# Patient Record
Sex: FEMALE | Race: WHITE | NOT HISPANIC OR LATINO | Employment: OTHER | URBAN - METROPOLITAN AREA
[De-identification: names, ages, dates, MRNs, and addresses within clinical notes are randomized per-mention and may not be internally consistent; named-entity substitution may affect disease eponyms.]

---

## 2017-01-18 ENCOUNTER — COMMUNICATION - HEALTHEAST (OUTPATIENT)
Dept: INTERNAL MEDICINE | Facility: CLINIC | Age: 77
End: 2017-01-18

## 2017-01-18 DIAGNOSIS — E11.9 TYPE 2 DIABETES MELLITUS (H): ICD-10-CM

## 2017-01-25 ENCOUNTER — OFFICE VISIT - HEALTHEAST (OUTPATIENT)
Dept: INTERNAL MEDICINE | Facility: CLINIC | Age: 77
End: 2017-01-25

## 2017-01-25 DIAGNOSIS — E78.2 MIXED HYPERLIPIDEMIA: ICD-10-CM

## 2017-01-25 DIAGNOSIS — E11.9 TYPE 2 DIABETES MELLITUS (H): ICD-10-CM

## 2017-01-25 DIAGNOSIS — E55.9 VITAMIN D DEFICIENCY: ICD-10-CM

## 2017-01-25 DIAGNOSIS — I10 ESSENTIAL HYPERTENSION WITH GOAL BLOOD PRESSURE LESS THAN 140/90: ICD-10-CM

## 2017-01-25 DIAGNOSIS — Z00.01 ENCOUNTER FOR GENERAL ADULT MEDICAL EXAMINATION WITH ABNORMAL FINDINGS: ICD-10-CM

## 2017-01-25 ASSESSMENT — MIFFLIN-ST. JEOR: SCORE: 1216.48

## 2017-02-13 ENCOUNTER — COMMUNICATION - HEALTHEAST (OUTPATIENT)
Dept: INTERNAL MEDICINE | Facility: CLINIC | Age: 77
End: 2017-02-13

## 2017-02-13 DIAGNOSIS — E11.9 TYPE 2 DIABETES MELLITUS (H): ICD-10-CM

## 2017-02-16 ENCOUNTER — COMMUNICATION - HEALTHEAST (OUTPATIENT)
Dept: INTERNAL MEDICINE | Facility: CLINIC | Age: 77
End: 2017-02-16

## 2017-02-16 DIAGNOSIS — I10 HYPERTENSION: ICD-10-CM

## 2017-04-10 ENCOUNTER — COMMUNICATION - HEALTHEAST (OUTPATIENT)
Dept: INTERNAL MEDICINE | Facility: CLINIC | Age: 77
End: 2017-04-10

## 2017-04-10 DIAGNOSIS — E78.5 HYPERLIPIDEMIA: ICD-10-CM

## 2017-04-11 ENCOUNTER — COMMUNICATION - HEALTHEAST (OUTPATIENT)
Dept: INTERNAL MEDICINE | Facility: CLINIC | Age: 77
End: 2017-04-11

## 2017-04-11 DIAGNOSIS — I10 HTN (HYPERTENSION): ICD-10-CM

## 2017-04-11 DIAGNOSIS — I10 HYPERTENSION: ICD-10-CM

## 2017-04-25 ENCOUNTER — OFFICE VISIT - HEALTHEAST (OUTPATIENT)
Dept: INTERNAL MEDICINE | Facility: CLINIC | Age: 77
End: 2017-04-25

## 2017-04-25 DIAGNOSIS — I10 HTN (HYPERTENSION): ICD-10-CM

## 2017-04-25 DIAGNOSIS — I10 HYPERTENSION: ICD-10-CM

## 2017-04-25 DIAGNOSIS — Z78.0 MENOPAUSE: ICD-10-CM

## 2017-04-25 DIAGNOSIS — E11.9 TYPE 2 DIABETES MELLITUS (H): ICD-10-CM

## 2017-04-25 LAB — HBA1C MFR BLD: 6.5 % (ref 3.5–6)

## 2017-04-25 ASSESSMENT — MIFFLIN-ST. JEOR: SCORE: 1178.83

## 2017-05-15 ENCOUNTER — RECORDS - HEALTHEAST (OUTPATIENT)
Dept: BONE DENSITY | Facility: CLINIC | Age: 77
End: 2017-05-15

## 2017-05-15 DIAGNOSIS — Z78.0 ASYMPTOMATIC MENOPAUSAL STATE: ICD-10-CM

## 2017-09-12 ENCOUNTER — COMMUNICATION - HEALTHEAST (OUTPATIENT)
Dept: INTERNAL MEDICINE | Facility: CLINIC | Age: 77
End: 2017-09-12

## 2017-09-21 ENCOUNTER — RECORDS - HEALTHEAST (OUTPATIENT)
Dept: ADMINISTRATIVE | Facility: OTHER | Age: 77
End: 2017-09-21

## 2017-10-11 ENCOUNTER — OFFICE VISIT - HEALTHEAST (OUTPATIENT)
Dept: INTERNAL MEDICINE | Facility: CLINIC | Age: 77
End: 2017-10-11

## 2017-10-11 DIAGNOSIS — E78.2 MIXED HYPERLIPIDEMIA: ICD-10-CM

## 2017-10-11 DIAGNOSIS — E11.9 TYPE 2 DIABETES MELLITUS WITHOUT COMPLICATION, WITHOUT LONG-TERM CURRENT USE OF INSULIN (H): ICD-10-CM

## 2017-10-11 DIAGNOSIS — I10 ESSENTIAL HYPERTENSION: ICD-10-CM

## 2017-10-11 LAB
CHOLEST SERPL-MCNC: 217 MG/DL
FASTING STATUS PATIENT QL REPORTED: YES
HBA1C MFR BLD: 6.3 % (ref 3.5–6)
HDLC SERPL-MCNC: 66 MG/DL
LDLC SERPL CALC-MCNC: 129 MG/DL
TRIGL SERPL-MCNC: 109 MG/DL

## 2018-01-04 ENCOUNTER — COMMUNICATION - HEALTHEAST (OUTPATIENT)
Dept: INTERNAL MEDICINE | Facility: CLINIC | Age: 78
End: 2018-01-04

## 2018-01-04 DIAGNOSIS — E78.5 HYPERLIPIDEMIA: ICD-10-CM

## 2018-01-12 ENCOUNTER — COMMUNICATION - HEALTHEAST (OUTPATIENT)
Dept: INTERNAL MEDICINE | Facility: CLINIC | Age: 78
End: 2018-01-12

## 2018-01-12 DIAGNOSIS — I10 HYPERTENSION: ICD-10-CM

## 2018-02-05 ENCOUNTER — COMMUNICATION - HEALTHEAST (OUTPATIENT)
Dept: INTERNAL MEDICINE | Facility: CLINIC | Age: 78
End: 2018-02-05

## 2018-02-05 DIAGNOSIS — E11.9 TYPE 2 DIABETES MELLITUS (H): ICD-10-CM

## 2018-02-12 ENCOUNTER — COMMUNICATION - HEALTHEAST (OUTPATIENT)
Dept: INTERNAL MEDICINE | Facility: CLINIC | Age: 78
End: 2018-02-12

## 2018-02-12 DIAGNOSIS — E11.9 TYPE 2 DIABETES MELLITUS WITHOUT COMPLICATION, WITHOUT LONG-TERM CURRENT USE OF INSULIN (H): ICD-10-CM

## 2018-04-16 ENCOUNTER — COMMUNICATION - HEALTHEAST (OUTPATIENT)
Dept: INTERNAL MEDICINE | Facility: CLINIC | Age: 78
End: 2018-04-16

## 2018-04-16 DIAGNOSIS — E11.9 TYPE 2 DIABETES MELLITUS WITHOUT COMPLICATION, WITHOUT LONG-TERM CURRENT USE OF INSULIN (H): ICD-10-CM

## 2018-05-22 ENCOUNTER — COMMUNICATION - HEALTHEAST (OUTPATIENT)
Dept: INTERNAL MEDICINE | Facility: CLINIC | Age: 78
End: 2018-05-22

## 2018-05-22 DIAGNOSIS — E11.9 TYPE 2 DIABETES MELLITUS (H): ICD-10-CM

## 2018-05-23 ENCOUNTER — RECORDS - HEALTHEAST (OUTPATIENT)
Dept: ADMINISTRATIVE | Facility: OTHER | Age: 78
End: 2018-05-23

## 2018-06-11 ENCOUNTER — COMMUNICATION - HEALTHEAST (OUTPATIENT)
Dept: INTERNAL MEDICINE | Facility: CLINIC | Age: 78
End: 2018-06-11

## 2018-06-11 DIAGNOSIS — I10 HYPERTENSION: ICD-10-CM

## 2018-06-26 ENCOUNTER — COMMUNICATION - HEALTHEAST (OUTPATIENT)
Dept: INTERNAL MEDICINE | Facility: CLINIC | Age: 78
End: 2018-06-26

## 2018-06-26 DIAGNOSIS — I10 HTN (HYPERTENSION): ICD-10-CM

## 2018-07-24 ENCOUNTER — COMMUNICATION - HEALTHEAST (OUTPATIENT)
Dept: INTERNAL MEDICINE | Facility: CLINIC | Age: 78
End: 2018-07-24

## 2018-07-24 DIAGNOSIS — E11.9 TYPE 2 DIABETES MELLITUS WITHOUT COMPLICATION, WITHOUT LONG-TERM CURRENT USE OF INSULIN (H): ICD-10-CM

## 2018-08-02 ENCOUNTER — OFFICE VISIT - HEALTHEAST (OUTPATIENT)
Dept: INTERNAL MEDICINE | Facility: CLINIC | Age: 78
End: 2018-08-02

## 2018-08-02 DIAGNOSIS — E78.2 MIXED HYPERLIPIDEMIA: ICD-10-CM

## 2018-08-02 DIAGNOSIS — E11.65 TYPE 2 DIABETES MELLITUS WITH HYPERGLYCEMIA, WITHOUT LONG-TERM CURRENT USE OF INSULIN (H): ICD-10-CM

## 2018-08-02 DIAGNOSIS — E55.9 VITAMIN D DEFICIENCY: ICD-10-CM

## 2018-08-02 DIAGNOSIS — I10 ESSENTIAL HYPERTENSION: ICD-10-CM

## 2018-08-02 LAB
ALBUMIN SERPL-MCNC: 4 G/DL (ref 3.5–5)
ALBUMIN UR-MCNC: NEGATIVE MG/DL
ALP SERPL-CCNC: 61 U/L (ref 45–120)
ALT SERPL W P-5'-P-CCNC: 10 U/L (ref 0–45)
ANION GAP SERPL CALCULATED.3IONS-SCNC: 9 MMOL/L (ref 5–18)
APPEARANCE UR: CLEAR
AST SERPL W P-5'-P-CCNC: 13 U/L (ref 0–40)
BACTERIA #/AREA URNS HPF: ABNORMAL HPF
BILIRUB DIRECT SERPL-MCNC: 0.2 MG/DL
BILIRUB SERPL-MCNC: 0.8 MG/DL (ref 0–1)
BILIRUB UR QL STRIP: NEGATIVE
BUN SERPL-MCNC: 17 MG/DL (ref 8–28)
CALCIUM SERPL-MCNC: 9.3 MG/DL (ref 8.5–10.5)
CHLORIDE BLD-SCNC: 101 MMOL/L (ref 98–107)
CHOLEST SERPL-MCNC: 214 MG/DL
CO2 SERPL-SCNC: 28 MMOL/L (ref 22–31)
COLOR UR AUTO: YELLOW
CREAT SERPL-MCNC: 0.65 MG/DL (ref 0.6–1.1)
CREAT UR-MCNC: 46.5 MG/DL
ERYTHROCYTE [DISTWIDTH] IN BLOOD BY AUTOMATED COUNT: 10.9 % (ref 11–14.5)
FASTING STATUS PATIENT QL REPORTED: YES
GFR SERPL CREATININE-BSD FRML MDRD: >60 ML/MIN/1.73M2
GLUCOSE BLD-MCNC: 168 MG/DL (ref 70–125)
GLUCOSE UR STRIP-MCNC: NEGATIVE MG/DL
HBA1C MFR BLD: 6.5 % (ref 3.5–6)
HCT VFR BLD AUTO: 40.7 % (ref 35–47)
HDLC SERPL-MCNC: 76 MG/DL
HGB BLD-MCNC: 13.7 G/DL (ref 12–16)
HGB UR QL STRIP: NEGATIVE
KETONES UR STRIP-MCNC: NEGATIVE MG/DL
LDLC SERPL CALC-MCNC: 124 MG/DL
LEUKOCYTE ESTERASE UR QL STRIP: ABNORMAL
MCH RBC QN AUTO: 30.9 PG (ref 27–34)
MCHC RBC AUTO-ENTMCNC: 33.8 G/DL (ref 32–36)
MCV RBC AUTO: 92 FL (ref 80–100)
MICROALBUMIN UR-MCNC: <0.5 MG/DL (ref 0–1.99)
MICROALBUMIN/CREAT UR: NORMAL MG/G
NITRATE UR QL: NEGATIVE
PH UR STRIP: 7 [PH] (ref 5–8)
PLATELET # BLD AUTO: 152 THOU/UL (ref 140–440)
PMV BLD AUTO: 8.2 FL (ref 7–10)
POTASSIUM BLD-SCNC: 4.2 MMOL/L (ref 3.5–5)
PROT SERPL-MCNC: 6.4 G/DL (ref 6–8)
RBC # BLD AUTO: 4.43 MILL/UL (ref 3.8–5.4)
RBC #/AREA URNS AUTO: ABNORMAL HPF
SODIUM SERPL-SCNC: 138 MMOL/L (ref 136–145)
SP GR UR STRIP: 1.02 (ref 1–1.03)
SQUAMOUS #/AREA URNS AUTO: ABNORMAL LPF
TRIGL SERPL-MCNC: 70 MG/DL
TSH SERPL DL<=0.005 MIU/L-ACNC: 2.23 UIU/ML (ref 0.3–5)
UROBILINOGEN UR STRIP-ACNC: ABNORMAL
WBC #/AREA URNS AUTO: ABNORMAL HPF
WBC: 5.2 THOU/UL (ref 4–11)

## 2018-08-03 LAB
25(OH)D3 SERPL-MCNC: 30 NG/ML (ref 30–80)
25(OH)D3 SERPL-MCNC: 30 NG/ML (ref 30–80)
BACTERIA SPEC CULT: NO GROWTH

## 2018-09-17 ENCOUNTER — COMMUNICATION - HEALTHEAST (OUTPATIENT)
Dept: INTERNAL MEDICINE | Facility: CLINIC | Age: 78
End: 2018-09-17

## 2018-09-17 DIAGNOSIS — E11.9 TYPE 2 DIABETES MELLITUS WITHOUT COMPLICATION, WITHOUT LONG-TERM CURRENT USE OF INSULIN (H): ICD-10-CM

## 2018-09-25 ENCOUNTER — COMMUNICATION - HEALTHEAST (OUTPATIENT)
Dept: INTERNAL MEDICINE | Facility: CLINIC | Age: 78
End: 2018-09-25

## 2018-09-25 DIAGNOSIS — I10 HTN (HYPERTENSION): ICD-10-CM

## 2018-09-29 ENCOUNTER — COMMUNICATION - HEALTHEAST (OUTPATIENT)
Dept: INTERNAL MEDICINE | Facility: CLINIC | Age: 78
End: 2018-09-29

## 2018-09-29 DIAGNOSIS — E78.5 HYPERLIPIDEMIA: ICD-10-CM

## 2018-10-10 ENCOUNTER — COMMUNICATION - HEALTHEAST (OUTPATIENT)
Dept: INTERNAL MEDICINE | Facility: CLINIC | Age: 78
End: 2018-10-10

## 2018-10-10 DIAGNOSIS — I10 HYPERTENSION: ICD-10-CM

## 2018-12-11 ENCOUNTER — COMMUNICATION - HEALTHEAST (OUTPATIENT)
Dept: INTERNAL MEDICINE | Facility: CLINIC | Age: 78
End: 2018-12-11

## 2018-12-11 DIAGNOSIS — I10 HYPERTENSION: ICD-10-CM

## 2019-04-24 ENCOUNTER — COMMUNICATION - HEALTHEAST (OUTPATIENT)
Dept: INTERNAL MEDICINE | Facility: CLINIC | Age: 79
End: 2019-04-24

## 2019-08-08 ENCOUNTER — RECORDS - HEALTHEAST (OUTPATIENT)
Dept: ADMINISTRATIVE | Facility: OTHER | Age: 79
End: 2019-08-08

## 2019-08-12 ENCOUNTER — RECORDS - HEALTHEAST (OUTPATIENT)
Dept: BONE DENSITY | Facility: CLINIC | Age: 79
End: 2019-08-12

## 2019-08-12 ENCOUNTER — RECORDS - HEALTHEAST (OUTPATIENT)
Dept: ADMINISTRATIVE | Facility: OTHER | Age: 79
End: 2019-08-12

## 2019-08-12 DIAGNOSIS — Z78.0 ASYMPTOMATIC MENOPAUSAL STATE: ICD-10-CM

## 2019-08-12 DIAGNOSIS — Z13.820 ENCOUNTER FOR SCREENING FOR OSTEOPOROSIS: ICD-10-CM

## 2021-05-30 VITALS — BODY MASS INDEX: 33.91 KG/M2 | HEIGHT: 60 IN | WEIGHT: 172.7 LBS

## 2021-05-30 VITALS — WEIGHT: 181 LBS | BODY MASS INDEX: 35.53 KG/M2 | HEIGHT: 60 IN

## 2021-05-31 VITALS — WEIGHT: 167.1 LBS | BODY MASS INDEX: 32.36 KG/M2

## 2021-06-01 VITALS — WEIGHT: 167.31 LBS | BODY MASS INDEX: 32.41 KG/M2

## 2021-06-08 NOTE — PROGRESS NOTES
Assessment and Plan:   Healthy woman with uncontrolled diabetes with an A1c level of 8.3.   Will start her on  Tradjenta 5 mg daily , she's already increased her metformin to twice daily.  We'll have her return to clinic to see me in 3 months for recheck.    1. Essential hypertension with goal blood pressure less than 140/90   blood pressures currently stable on medication.    2. Mixed hyperlipidemia   she remains on a statin.    3. Vitamin D deficiency   she is trying to take a supplement daily,    4. Type 2 diabetes mellitus   as above, Will start her on Tradjenta.    5. Encounter for general adult medical examination with abnormal findings        The patient's current medical problems were reviewed.    The following high BMI interventions were performed this visit: encouragement to exercise and weight monitoring  The following health maintenance schedule was reviewed with the patient and provided in printed form in the after visit summary:   Health Maintenance   Topic Date Due     DIABETES FOOT EXAM  09/16/1950     DIABETES OPHTHALMOLOGY EXAM  09/16/1950     DIABETES URINE MICROALBUMIN  09/16/1950     ADVANCE DIRECTIVES DISCUSSED WITH PATIENT  09/16/1958     PNEUMOCOCCAL CONJUGATE VACCINE FOR ADULTS (PCV13 OR PREVNAR)  09/16/2005     FALL RISK ASSESSMENT  09/16/2005     INFLUENZA VACCINE RULE BASED (1) 08/01/2016     DIABETES FOLLOW-UP  07/18/2017     TD 18+ HE  02/16/2020     PNEUMOCOCCAL POLYSACCHARIDE VACCINE AGE 65 AND OVER  Completed     ZOSTER VACCINE  Completed        Subjective:   Chief Complaint: Carola Schaffer is an 76 y.o. female here for an Annual Wellness visit.   HPI:   Carola has been doing okay but admits she has been lax in following a diabetic diet and had blood testing drawn through their lab and her A1c level was 8.3 on January 12. She has since increased her metformin to twice daily and she is trying to be more careful with her diet.    Review of Systems:   Please see above.  The rest of the  review of systems are negative for all systems.    Patient Care Team:  America Walden MD as PCP - General     Patient Active Problem List   Diagnosis     Basal Cell Carcinoma Of The Skin     Benign Polyps Of The Large Intestine     Mixed hyperlipidemia     Hypertension     Vitamin D Deficiency     Osteopenia     Type 2 diabetes mellitus     Past Medical History   Diagnosis Date     Hypertension      Created by Conversion  Replacement Utility updated for latest IMO load     Impaired fasting glucose      Created by Conversion       Past Surgical History   Procedure Laterality Date     Pr ligate fallopian tube       Description: Tubal Ligation;  Recorded: 2013;      Family History   Problem Relation Age of Onset     Heart failure Father       age 69     Stroke Mother       age 85      Social History     Social History     Marital status:      Spouse name: N/A     Number of children: N/A     Years of education: N/A     Occupational History     Not on file.     Social History Main Topics     Smoking status: Never Smoker     Smokeless tobacco: Not on file     Alcohol use Not on file     Drug use: Not on file     Sexual activity: Not on file     Other Topics Concern     Not on file     Social History Narrative    She is . Her  is Chetan Schaffer, a rheumatologist. 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 They have 6 children, 3 of whom are physicians, and 9 grandchildren. She enjoys ballroom dancing.  She is originally from [place].       Current Outpatient Prescriptions   Medication Sig Dispense Refill     lisinopril (PRINIVIL,ZESTRIL) 20 MG tablet TAKE 1 TABLET (20 MG TOTAL) BY MOUTH DAILY. 90 tablet 3     metFORMIN (GLUCOPHAGE-XR) 500 MG 24 hr tablet Take 1 tablet (500 mg total) by mouth 2 (two) times a day. 90 tablet 3     metoprolol succinate (TOPROL-XL) 50 MG 24 hr tablet TAKE 1 TABLET (50 MG) BY ORAL ROUTE ONCE DAILY 90 tablet 0     simvastatin  "(ZOCOR) 80 MG tablet TAKE 1 TABLET (80 MG TOTAL) BY MOUTH DAILY. 90 tablet 3     triamterene-hydrochlorothiazide (MAXZIDE-25) 37.5-25 mg per tablet TAKE 0.5 TABLETS BY MOUTH DAILY. 45 tablet 3     linagliptin (TRADJENTA) 5 mg Tab One daily 90 tablet 5     No current facility-administered medications for this visit.       Objective:   Vital Signs:   Visit Vitals     /72     Pulse 92     Resp 14     Ht 5' 0.25\" (1.53 m)     Wt 181 lb (82.1 kg)     Breastfeeding No     BMI 35.06 kg/m2        VisionScreening:   Visual Acuity Screening    Right eye Left eye Both eyes   Without correction: 20/25 20/25 20/25   With correction: 20/20 20/20 20/20        PHYSICAL EXAM  Wt Readings from Last 3 Encounters:   01/25/17 181 lb (82.1 kg)   12/09/15 182 lb 6.4 oz (82.7 kg)     General Appearance: Pleasant and alert  HEENT: Sclera are clear, tympanic membranes clear  Lungs: Normal respirations, clear to auscultation  Breasts: normal-appearing breasts and nipples with no no axillary adenopathy  Cardiac: regular rate and rhythm  Abdomen: Soft and nondistended  Extremities: No edema  Skin: No rashes  Neuro: Moves all extremities and has facial symmetry  Gait: Ambulates with a normal gait    Personalized prevention plan: Lifestyle interventions to promote self-management and wellness were discussed including good nutrition, ongoing physical activity, falls prevention and continuing with screening tests as recommended including she is due for a pneumonia vaccine when she returns to clinic and those listed in the health maintenance plan listed above.      Much or all of the text in this note was generated through the use of Dragon Dictate voice-to-text software. Errors in spelling or words which seem out of context are unintentional. Sound alike errors, in particular, may have escaped editing.                    Assessment Results 1/25/2017   Activities of Daily Living No help needed   Instrumental Activities of Daily Living No help " needed   Get Up and Go Score Less than 12 seconds   Mini Cog Total Score 5     A Mini-Cog score of 0-2 suggests the possibility of dementia, score of 3-5 suggests no dementia    Identified Health Risks:     Patient's advanced directive was discussed and I am comfortable with the patient's wishes.

## 2021-06-10 NOTE — PROGRESS NOTES
"[facility] Follow Up Note    Carola Schaffer   76 y.o. female    Date of Visit: 4/25/2017    Chief Complaint   Patient presents with     Diabetes     Subjective  Carola comes in today to follow-up her diabetes.  Overall, she is actually doing fairly well.  Her sugars for the most part run under 150 in the mornings.  She was started on Salagen recently for a dry mouth which he feels is helping a bit.    ROS A comprehensive review of systems was performed and was otherwise negative    Social History:   Social History     Social History Narrative    She is . Her  is Chetan Schaffer, a rheumatologist. 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 They have 6 children, 3 of whom are physicians, and 9 grandchildren. She enjoys ballroom dancing.  She is originally from [place].        Medications were reconciled.  Allergies, social and family history, and the problem list were all reviewed and updated.      Exam  General Appearance: Pleasant and alert  Vitals:    04/25/17 0825   BP: 120/70   Pulse: 72   Resp: 14   Weight: 172 lb 11.2 oz (78.3 kg)   Height: 5' 0.25\" (1.53 m)      Body mass index is 33.45 kg/(m^2).  Wt Readings from Last 3 Encounters:   04/25/17 172 lb 11.2 oz (78.3 kg)   01/25/17 181 lb (82.1 kg)   12/09/15 182 lb 6.4 oz (82.7 kg)     HEENT: Sclera are clear.   Lungs: Normal respirations  Abdomen: Soft and nondistended  Extremities: No edema  Skin: No rashes  Neuro: Moves all extremities and has facial symmetry  Gait: Ambulates with a normal gait    Assessment/Plan  1. Type 2 diabetes mellitus  Sounds as though overall glucose control is good, will verify with an A1c level.  - Microalbumin, Random Urine  - Glycosylated Hemoglobin A1c    2. Hypertension  Blood pressure is on her current regimen.  - lisinopril (PRINIVIL,ZESTRIL) 20 MG tablet; TAKE 1 TABLET (20 MG TOTAL) BY MOUTH DAILY.  Dispense: 90 tablet; Refill: 5  - Basic Metabolic Panel  - " HM2([facility] w/o Differential)  - triamterene-hydrochlorothiazide (MAXZIDE-25) 37.5-25 mg per tablet; TAKE HALF OF A TABLET BY MOUTH DAILY  Dispense: 45 tablet; Refill: 5    3. Menopause  Is due for a bone density test.  - DXA Bone Density Scan; Future      April D MD [place]  4/25/2017    Much or all of the text in this note was generated through the use of Dragon Dictate voice-to-text software. Errors in spelling or words which seem out of context are unintentional. Sound alike errors, in particular, may have escaped editing.

## 2021-06-13 NOTE — PROGRESS NOTES
[facility] Follow Up Note    Carola Schaffer   77 y.o. female    Date of Visit: 10/11/2017    Chief Complaint   Patient presents with     Follow-up     DM     Subjective  Carola comes in today for follow-up of her diabetes, hypertension, and hyperlipidemia.  She is feeling very well and has no particular concerns or problems.  She and her  continued to run the rheumatology practice.    ROS A comprehensive review of systems was performed and was otherwise negative    Social History:   Social History     Social History Narrative    She is . Her  is Chetan Schaffer, a rheumatologist. 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 They have 6 children, 3 of whom are physicians, and 9 grandchildren. She enjoys ballroom dancing.  She is originally from [place].        Medications were reconciled.  Allergies, social and family history, and the problem list were all reviewed and updated.      Exam  General Appearance: Pleasant and alert  Vitals:    10/11/17 0837   BP: 130/72   Patient Site: Left Arm   Patient Position: Sitting   Cuff Size: Adult Regular   Pulse: 64   Weight: 167 lb 1.6 oz (75.8 kg)      Body mass index is 32.36 kg/(m^2).  Wt Readings from Last 3 Encounters:   10/11/17 167 lb 1.6 oz (75.8 kg)   04/25/17 172 lb 11.2 oz (78.3 kg)   01/25/17 181 lb (82.1 kg)     HEENT: Sclera are clear.   Lungs: Normal respirations  Cardiac: Regular rate and rhythm  Abdomen: Soft and nondistended  Extremities: No edema  Skin: No rashes  Neuro: Moves all extremities and has facial symmetry  Gait: Ambulates with a normal gait    Assessment/Plan  1. Type 2 diabetes mellitus without complication, without long-term current use of insulin  Overall seems to be doing well on her current regimen.  - Glycosylated Hemoglobin A1c    2. Hypertension  Stable on medication.  - Basic Metabolic Panel  - HM2(CBC w/o Differential)  - Thyroid Stimulating Hormone (TSH)    3. Mixed  hyperlipidemia  Has been stable on medication.  - Hepatic Profile  - Lipid Cascade          April D MD [place]  10/11/2017    Much or all of the text in this note was generated through the use of Dragon Dictate voice-to-text software. Errors in spelling or words which seem out of context are unintentional. Sound alike errors, in particular, may have escaped editing.

## 2021-06-19 NOTE — PROGRESS NOTES
[facility] Follow Up Note    Carola Schaffer   77 y.o. female    Date of Visit: 8/2/2018    Chief Complaint   Patient presents with     Diabetes     Subjective  Carola comes in today for follow-up of her diabetes, hypertension and hyperlipidemia.  She feels well and is doing well.  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  She reports no complications or problems with her medications.  Diabetic control has been quite good.    ROS A comprehensive review of systems was performed and was otherwise negative.    Social History     Social History Narrative    She is . Her  is Chetan Schaffer, a rheumatologist. 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 They have 6 children, 3 of whom are physicians, and 10 grandchildren (one more on the way).  She enjoys ballroom dancing.  She is originally from [place].        Medications were reconciled.  Allergies, social and family history, and the problem list were all reviewed and updated.      Exam  General Appearance: Pleasant and alert   Vitals:    08/02/18 0826   BP: 130/76   Patient Site: Left Arm   Patient Position: Sitting   Cuff Size: Adult Regular   Pulse: 66   SpO2: 98%   Weight: 167 lb 5 oz (75.9 kg)      Body mass index is 32.41 kg/(m^2).  Wt Readings from Last 3 Encounters:   08/02/18 167 lb 5 oz (75.9 kg)   10/11/17 167 lb 1.6 oz (75.8 kg)   04/25/17 172 lb 11.2 oz (78.3 kg)     HEENT: Sclera are clear.   Lungs: Normal respirations  Cardiac: Regular rate and rhythm   Abdomen: Soft and nondistended  Extremities: No edema  Skin: No rashes  Neuro: Moves all extremities and has facial symmetry  Gait: Ambulates with a normal gait    Assessment/Plan  1. Type 2 diabetes mellitus with hyperglycemia, without long-term current use of insulin  Update labs, if everything looks good with continue with her current regimen which has worked well for  her.  - Microalbumin, Random Urine  - Glycosylated Hemoglobin A1c    2. Mixed hyperlipidemia  She is on a statin, she is not taking an aspirin and she has no history of vascular disease.  - Hepatic Profile  - Lipid Cascade    3. Vitamin D deficiency  - Vitamin D, Total (25-Hydroxy)    4. Hypertension  Stable on medication.  - Basic Metabolic Panel  - HM2(CBC w/o Differential)  - Thyroid Stimulating Hormone (TSH)  - Urinalysis-UC if Indicated  - Culture, Urine          April D MD [place]  Internal and Geriatric Medicine  [facility]    Much or all of the text in this note was generated through the use of Dragon Dictate voice-to-text software. Errors in spelling or words which seem out of context are unintentional. Sound alike errors, in particular, may have escaped editing.

## 2021-07-11 ENCOUNTER — HEALTH MAINTENANCE LETTER (OUTPATIENT)
Age: 81
End: 2021-07-11

## 2021-09-05 ENCOUNTER — HEALTH MAINTENANCE LETTER (OUTPATIENT)
Age: 81
End: 2021-09-05

## 2022-06-23 ENCOUNTER — TRANSFERRED RECORDS (OUTPATIENT)
Dept: HEALTH INFORMATION MANAGEMENT | Facility: CLINIC | Age: 82
End: 2022-06-23

## 2022-08-07 ENCOUNTER — HEALTH MAINTENANCE LETTER (OUTPATIENT)
Age: 82
End: 2022-08-07

## 2022-10-23 ENCOUNTER — HEALTH MAINTENANCE LETTER (OUTPATIENT)
Age: 82
End: 2022-10-23

## 2022-10-27 ENCOUNTER — TELEPHONE (OUTPATIENT)
Dept: NEUROSURGERY | Facility: CLINIC | Age: 82
End: 2022-10-27

## 2022-10-27 NOTE — TELEPHONE ENCOUNTER
[facility]    Phone Message    May a detailed message be left on voicemail: yes     Reason for Call: Appointment Intake    Referring Provider Name: Self referred  Diagnosis and/or Symptoms: herniated disk L5-S1 happened in may, very painful They are requesting Dr. Nieto    Action Taken: Other: Neurosurgery    Travel Screening: Not Applicable

## 2022-11-01 NOTE — TELEPHONE ENCOUNTER
[facility]    Phone Message    May a detailed message be left on voicemail: yes     Reason for Call: Other: Patient is calling regarding setting up an appointment. Please call to discuss.      Action Taken: Other: Neurosurgery    Travel Screening: Not Applicable

## 2022-11-01 NOTE — TELEPHONE ENCOUNTER
Called patient. She was told by Dr. Alanis and team during her 's surgery that she should see Dr. Nieto.    Patient had injections that helped friefly and MRI in June at [facility].    Told patient we would have Dr. Nieto review when he returns to clinic and get back to her.    Vee Dunbar

## 2022-11-01 NOTE — TELEPHONE ENCOUNTER
Lumbar MRI from [facility] 06/23/2022 in PACS. Care everywhere also accessed and lumbar xrays can be seen 07/07/2022 encounter from [facility].    Sheyla Carver LPN

## 2022-11-04 NOTE — TELEPHONE ENCOUNTER
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

## 2022-11-07 ENCOUNTER — TELEPHONE (OUTPATIENT)
Dept: NEUROSURGERY | Facility: CLINIC | Age: 82
End: 2022-11-07

## 2022-11-07 DIAGNOSIS — M51.369 LUMBAR DEGENERATIVE DISC DISEASE: Primary | ICD-10-CM

## 2022-11-10 ENCOUNTER — ANCILLARY PROCEDURE (OUTPATIENT)
Dept: GENERAL RADIOLOGY | Facility: CLINIC | Age: 82
End: 2022-11-10
Attending: NEUROLOGICAL SURGERY
Payer: MEDICARE

## 2022-11-10 ENCOUNTER — ANCILLARY PROCEDURE (OUTPATIENT)
Dept: CT IMAGING | Facility: CLINIC | Age: 82
End: 2022-11-10
Attending: NEUROLOGICAL SURGERY
Payer: MEDICARE

## 2022-11-10 ENCOUNTER — OFFICE VISIT (OUTPATIENT)
Dept: NEUROSURGERY | Facility: CLINIC | Age: 82
End: 2022-11-10
Payer: MEDICARE

## 2022-11-10 ENCOUNTER — PRE VISIT (OUTPATIENT)
Dept: NEUROSURGERY | Facility: CLINIC | Age: 82
End: 2022-11-10

## 2022-11-10 VITALS
WEIGHT: 149 LBS | SYSTOLIC BLOOD PRESSURE: 137 MMHG | DIASTOLIC BLOOD PRESSURE: 74 MMHG | HEART RATE: 82 BPM | BODY MASS INDEX: 28.86 KG/M2 | OXYGEN SATURATION: 98 %

## 2022-11-10 DIAGNOSIS — M51.369 LUMBAR DEGENERATIVE DISC DISEASE: ICD-10-CM

## 2022-11-10 DIAGNOSIS — M51.369 LUMBAR DEGENERATIVE DISC DISEASE: Primary | ICD-10-CM

## 2022-11-10 PROCEDURE — 99203 OFFICE O/P NEW LOW 30 MIN: CPT | Performed by: NEUROLOGICAL SURGERY

## 2022-11-10 PROCEDURE — G1010 CDSM STANSON: HCPCS | Mod: GC | Performed by: RADIOLOGY

## 2022-11-10 PROCEDURE — 72131 CT LUMBAR SPINE W/O DYE: CPT | Mod: MG | Performed by: RADIOLOGY

## 2022-11-10 PROCEDURE — 72110 X-RAY EXAM L-2 SPINE 4/>VWS: CPT | Mod: XS | Performed by: STUDENT IN AN ORGANIZED HEALTH CARE EDUCATION/TRAINING PROGRAM

## 2022-11-10 PROCEDURE — 72082 X-RAY EXAM ENTIRE SPI 2/3 VW: CPT | Performed by: STUDENT IN AN ORGANIZED HEALTH CARE EDUCATION/TRAINING PROGRAM

## 2022-11-10 RX ORDER — PILOCARPINE HYDROCHLORIDE 5 MG/1
5 TABLET, FILM COATED ORAL 3 TIMES DAILY PRN
COMMUNITY

## 2022-11-10 RX ORDER — UBIDECARENONE 75 MG
100 CAPSULE ORAL DAILY
COMMUNITY
End: 2022-12-01

## 2022-11-10 RX ORDER — ALENDRONATE SODIUM 35 MG/1
35 TABLET ORAL
COMMUNITY

## 2022-11-10 RX ORDER — MULTIVIT-MIN/IRON/FOLIC ACID/K 18-600-40
CAPSULE ORAL
COMMUNITY
End: 2022-12-01

## 2022-11-10 RX ORDER — CARBOXYMETHYLCELLULOSE SODIUM 5 MG/ML
1 SOLUTION/ DROPS OPHTHALMIC 3 TIMES DAILY PRN
COMMUNITY

## 2022-11-10 ASSESSMENT — PAIN SCALES - GENERAL: PAINLEVEL: EXTREME PAIN (8)

## 2022-11-10 NOTE — LETTER
11/10/2022       RE: Carola Schaffer  [address] [place] [address]     Dear Colleague,    Thank you for referring your patient, Carola Schaffer, to the [facility] [place] at [facility]. Please see a copy of my visit note below.    Date of Service: 11/10/2022    Carola Schaffer is a 82 year old female who presents to our clinic for a consultation requested Dr. Alanis for evaluation of right leg radiculopathy.  Her  is a rheumatologist who recently underwent carotid endarterectomy by Dr. Alanis.  She states that her symptoms began acutely around Mother's day.  She recalls no precipitating event such as trauma or fall.  Pain began in the right buttock and radiated down the lateral aspect of the right leg.  She also symptoms in the dorsum and also into the lateral aspect of the foot.  Intially Tylenol helped but she experienced more difficulty with sitting, standing and walking.  Leaning forward helps.  Pain often worsens towards the end of the day.    She has minimal back pain.  Has had two injections: the first at L5 nerve which gave her relief but temporary and the second may have been at S1.  She denies any weakness.    Past medical History:  1. Hypertension  2. Diabetes    Past Surgical History:  1. Tubal Ligation    Current Outpatient Medications   Medication     alendronate (FOSAMAX) 35 MG tablet     Calcium Carbonate Antacid (TUMS ULTRA 1000 PO)     carboxymethylcellulose PF (REFRESH PLUS) 0.5 % ophthalmic solution     cyanocobalamin (VITAMIN B-12) 100 MCG tablet     linagliptin (TRADJENTA) 5 MG TABS tablet     lisinopril (PRINIVIL,ZESTRIL) 20 MG tablet     metFORMIN (GLUCOPHAGE-XR) 500 MG 24 hr tablet     metoprolol succinate ER (TOPROL XL) 50 MG 24 hr tablet     pilocarpine (SALAGEN) 5 MG tablet     simvastatin (ZOCOR) 40 MG tablet     triamterene-hydrochlorothiazide (MAXZIDE-25) 37.5-25 MG per tablet     Vitamin D,  Cholecalciferol, 25 MCG (1000 UT) TABS     vitamin D3 (CHOLECALCIFEROL) 250 mcg (05451 units) capsule     Omega-3 Fat Ac-Cholecalciferol (MINICAPS VITAMIN-D/OMEGA-3) 350-1000 MG-UNIT CAPS     No current facility-administered medications for this visit.     She is no no acute distress.  Leaning forward while sitting seems to reduce the pain.  Motor intact.  No dorsiflexion or plantarflexion weakness.  No numbness.    I have personally reviewed the lumbar spine MRI which shows moderate degenerative disk disease at L3-4 and L4-5 with mild subluxation.  There is severe right L5-S1 foraminal stenosis causing compression of the L5 nerve root due to lateral disk herniation.  There is also some lateral recess stenosis at l5-S1.    Impression: Probable right L5 radiculopathy    Plan:    Carola Schaffer is a 82 year old female who presents with what appears to be L5 radiculopathy although she has some discomfort in the lateral aspect of the dorsum on the right foot but not very laterally into the toes.  She has good relief with the first injection which was reported to be at L5.  There is a compression of the L5 nerve root on the MRI on the right side.  Although a small chance that she may need a fusion at L5-S1, I think it would be possible to decompress the nerve root via microdiscectomy.  We will obtain the CT of lumbar spine to assess the bony anatomy and will finalize the plan.          Again, thank you for allowing me to participate in the care of your patient.      Sincerely,    Syed Nieto MD

## 2022-11-14 NOTE — PROGRESS NOTES
Date of Service: 11/10/2022    Carola Schaffer is a 82 year old female who presents to our clinic for a consultation requested Dr. Alanis for evaluation of right leg radiculopathy.  Her  is a rheumatologist who recently underwent carotid endarterectomy by Dr. Alanis.  She states that her symptoms began acutely around Mother's day.  She recalls no precipitating event such as trauma or fall.  Pain began in the right buttock and radiated down the lateral aspect of the right leg.  She also symptoms in the dorsum and also into the lateral aspect of the foot.  Intially Tylenol helped but she experienced more difficulty with sitting, standing and walking.  Leaning forward helps.  Pain often worsens towards the end of the day.    She has minimal back pain.  Has had two injections: the first at L5 nerve which gave her relief but temporary and the second may have been at S1.  She denies any weakness.    Past medical History:  1. Hypertension  2. Diabetes    Past Surgical History:  1. Tubal Ligation    Current Outpatient Medications   Medication     alendronate (FOSAMAX) 35 MG tablet     Calcium Carbonate Antacid (TUMS ULTRA 1000 PO)     carboxymethylcellulose PF (REFRESH PLUS) 0.5 % ophthalmic solution     cyanocobalamin (VITAMIN B-12) 100 MCG tablet     linagliptin (TRADJENTA) 5 MG TABS tablet     lisinopril (PRINIVIL,ZESTRIL) 20 MG tablet     metFORMIN (GLUCOPHAGE-XR) 500 MG 24 hr tablet     metoprolol succinate ER (TOPROL XL) 50 MG 24 hr tablet     pilocarpine (SALAGEN) 5 MG tablet     simvastatin (ZOCOR) 40 MG tablet     triamterene-hydrochlorothiazide (MAXZIDE-25) 37.5-25 MG per tablet     Vitamin D, Cholecalciferol, 25 MCG (1000 UT) TABS     vitamin D3 (CHOLECALCIFEROL) 250 mcg (96645 units) capsule     Omega-3 Fat Ac-Cholecalciferol (MINICAPS VITAMIN-D/OMEGA-3) 350-1000 MG-UNIT CAPS     No current facility-administered medications for this visit.     She is no no acute distress.  Leaning forward while sitting  seems to reduce the pain.  Motor intact.  No dorsiflexion or plantarflexion weakness.  No numbness.    I have personally reviewed the lumbar spine MRI which shows moderate degenerative disk disease at L3-4 and L4-5 with mild subluxation.  There is severe right L5-S1 foraminal stenosis causing compression of the L5 nerve root due to lateral disk herniation.  There is also some lateral recess stenosis at l5-S1.    Impression: Probable right L5 radiculopathy    Plan:    Carola Schaffer is a 82 year old female who presents with what appears to be L5 radiculopathy although she has some discomfort in the lateral aspect of the dorsum on the right foot but not very laterally into the toes.  She has good relief with the first injection which was reported to be at L5.  There is a compression of the L5 nerve root on the MRI on the right side.  Although a small chance that she may need a fusion at L5-S1, I think it would be possible to decompress the nerve root via microdiscectomy.  We will obtain the CT of lumbar spine to assess the bony anatomy and will finalize the plan.    Syed Nieto M.D.

## 2022-11-23 DIAGNOSIS — M51.369 LUMBAR DEGENERATIVE DISC DISEASE: Primary | ICD-10-CM

## 2022-11-25 ENCOUNTER — TELEPHONE (OUTPATIENT)
Dept: NEUROSURGERY | Facility: CLINIC | Age: 82
End: 2022-11-25

## 2022-11-25 NOTE — TELEPHONE ENCOUNTER
I called the patient in regards to scheduling surgery with Dr. Nieto. Patient has a covid test scheduled at [facility] and pre op has been taken care of with PAC in person. Patient is aware that the nursing team will be reaching out within the next few days. I did tell patient that a nurse will reach out within 2-3 days prior to surgery with arrival time and instructions.    Surgeon: Dr. Nieto  Date of Surgery: 12/13/2022  Location of surgery: [place] [facility]  Pre-Op H&P: 12/2/2022  Post-Op Appt Date: TBD   Imaging needed:  No  Discussed COVID-19 testing:  Yes  Pre-cert/Authorization completed:  Yes  Patient aware that pre-op RN will call 2-3 days prior to surgery with arrival time and instructions Yes  Packet sent out: No 11/25/22  Patient was instructed to review packet and call back with any questions or concerns.         Sophia Geiger on 11/25/2022 at 12:35 PM

## 2022-11-25 NOTE — TELEPHONE ENCOUNTER
FUTURE VISIT INFORMATION      SURGERY INFORMATION:    Date: 12/13/22    Location: [facility] or    Surgeon:  Syed Nieto MD    Anesthesia Type:  general    Procedure: Right L5-S1 Microdiscectomy    Consult: ov 11/10    RECORDS REQUESTED FROM:       Primary Care Provider:Jayme Hall MD- [facility]    Pertinent Medical History: hypertension    Most recent EKG+ Tracing:    Most recent ECHO: 9/17/13    Most recent Cardiac Stress Test: 11/8/13

## 2022-12-01 RX ORDER — LANOLIN ALCOHOL/MO/W.PET/CERES
1000 CREAM (GRAM) TOPICAL
COMMUNITY

## 2022-12-01 RX ORDER — SIMVASTATIN 80 MG
80 TABLET ORAL AT BEDTIME
COMMUNITY

## 2022-12-01 RX ORDER — MELOXICAM 15 MG/1
15 TABLET ORAL DAILY PRN
COMMUNITY

## 2022-12-01 RX ORDER — ACETAMINOPHEN 500 MG
500-1000 TABLET ORAL EVERY 8 HOURS PRN
COMMUNITY

## 2022-12-01 NOTE — PROGRESS NOTES
Preoperative Assessment Center Medication History Note    Medication history completed on December 1, 2022 by this writer. See Epic admission navigator for prior to admission medications. Operating room staff will still need to confirm medications and last dose information on day of surgery.     Medication history interview sources  Patient interview: Yes  Care Everywhere records: Yes  Surescripts pharmacy refill records: Yes  Other (if applicable):     Changes made to PTA medication list  Added: none  Deleted: fish oil/vit D combo,   Changed: cholecalciferol dose, B12 dose, metformin dose/sig,  Simvastatin dose,     Additional medication history information (including reliability of information, actions taken by pharmacist):    -- No recent (within 30 days) course of antibiotics  -- No recent (within 30 days) course of systemic steroids  -- Reports not being on blood thinning medications (except meloxicam, pt aware of need to hold x10 days pre op).    -- Declines being on any other prescription or over-the-counter medications    Prior to Admission medications    Medication Sig Last Dose Taking? Auth Provider Long Term End Date   acetaminophen (TYLENOL) 500 MG tablet Take 500-1,000 mg by mouth every 8 hours as needed for mild pain Taking Yes Unknown, Entered By History     alendronate (FOSAMAX) 35 MG tablet Take 35 mg by mouth every 7 days Wednesdays Taking Yes Reported, Patient Yes    Calcium Carbonate Antacid (TUMS ULTRA 1000 PO) Take 1 tablet by mouth every morning Taking Yes Reported, Patient     carboxymethylcellulose PF (REFRESH PLUS) 0.5 % ophthalmic solution 1 drop 3 times daily as needed for dry eyes Taking Yes Reported, Patient     cholecalciferol 25 MCG (1000 UT) TABS Take 1,000 Units by mouth three times a week Taking Yes Unknown, Entered By History     cyanocobalamin (VITAMIN B-12) 1000 MCG tablet Take 1,000 mcg by mouth three times a week Taking Yes Unknown, Entered By History     linagliptin  (TRADJENTA) 5 MG TABS tablet Take 5 mg by mouth daily Taking Yes Reported, Patient Yes    lisinopril (PRINIVIL,ZESTRIL) 20 MG tablet Take 20 mg by mouth daily (with breakfast) Taking Yes Reported, Patient Yes    meloxicam (MOBIC) 15 MG tablet Take 15 mg by mouth daily as needed Taking Yes Unknown, Entered By History Yes    metFORMIN (GLUCOPHAGE-XR) 500 MG 24 hr tablet Take 500 mg by mouth 2 times daily (with meals) Taking Yes Reported, Patient Yes    metoprolol succinate ER (TOPROL XL) 50 MG 24 hr tablet Take 50 mg by mouth every evening Taking Yes Reported, Patient Yes    simvastatin (ZOCOR) 80 MG tablet Take 80 mg by mouth At Bedtime Taking Yes Unknown, Entered By History Yes    triamterene-hydrochlorothiazide (MAXZIDE-25) 37.5-25 MG per tablet Take 0.5 tablets by mouth every morning Taking Yes Reported, Patient Yes    pilocarpine (SALAGEN) 5 MG tablet Take 5 mg by mouth 3 times daily as needed  Patient not taking: Reported on 12/1/2022 Not Taking  Reported, Patient            Medication history completed by: Tye Marie [facility]

## 2022-12-02 ENCOUNTER — OFFICE VISIT (OUTPATIENT)
Dept: SURGERY | Facility: CLINIC | Age: 82
End: 2022-12-02
Payer: MEDICARE

## 2022-12-02 ENCOUNTER — PRE VISIT (OUTPATIENT)
Dept: SURGERY | Facility: CLINIC | Age: 82
End: 2022-12-02

## 2022-12-02 ENCOUNTER — ANESTHESIA EVENT (OUTPATIENT)
Dept: SURGERY | Facility: CLINIC | Age: 82
DRG: 517 | End: 2022-12-02
Payer: MEDICARE

## 2022-12-02 ENCOUNTER — LAB (OUTPATIENT)
Dept: LAB | Facility: CLINIC | Age: 82
End: 2022-12-02
Payer: MEDICARE

## 2022-12-02 VITALS
WEIGHT: 148 LBS | OXYGEN SATURATION: 97 % | SYSTOLIC BLOOD PRESSURE: 167 MMHG | HEIGHT: 60 IN | DIASTOLIC BLOOD PRESSURE: 88 MMHG | RESPIRATION RATE: 16 BRPM | BODY MASS INDEX: 29.06 KG/M2 | TEMPERATURE: 97.8 F | HEART RATE: 70 BPM

## 2022-12-02 DIAGNOSIS — Z01.818 PREOP EXAMINATION: ICD-10-CM

## 2022-12-02 DIAGNOSIS — M51.369 LUMBAR DEGENERATIVE DISC DISEASE: ICD-10-CM

## 2022-12-02 DIAGNOSIS — Z01.818 PREOP EXAMINATION: Primary | ICD-10-CM

## 2022-12-02 LAB
ANION GAP SERPL CALCULATED.3IONS-SCNC: 13 MMOL/L (ref 7–15)
BUN SERPL-MCNC: 23 MG/DL (ref 8–23)
CALCIUM SERPL-MCNC: 9.6 MG/DL (ref 8.8–10.2)
CHLORIDE SERPL-SCNC: 104 MMOL/L (ref 98–107)
CREAT SERPL-MCNC: 0.63 MG/DL (ref 0.51–0.95)
DEPRECATED HCO3 PLAS-SCNC: 25 MMOL/L (ref 22–29)
ERYTHROCYTE [DISTWIDTH] IN BLOOD BY AUTOMATED COUNT: 11.7 % (ref 10–15)
GFR SERPL CREATININE-BSD FRML MDRD: 88 ML/MIN/1.73M2
GLUCOSE SERPL-MCNC: 178 MG/DL (ref 70–99)
HCT VFR BLD AUTO: 38.7 % (ref 35–47)
HGB BLD-MCNC: 13 G/DL (ref 11.7–15.7)
MCH RBC QN AUTO: 32.2 PG (ref 26.5–33)
MCHC RBC AUTO-ENTMCNC: 33.6 G/DL (ref 31.5–36.5)
MCV RBC AUTO: 96 FL (ref 78–100)
PLATELET # BLD AUTO: 179 10E3/UL (ref 150–450)
POTASSIUM SERPL-SCNC: 4.3 MMOL/L (ref 3.4–5.3)
RBC # BLD AUTO: 4.04 10E6/UL (ref 3.8–5.2)
SODIUM SERPL-SCNC: 142 MMOL/L (ref 136–145)
WBC # BLD AUTO: 5.8 10E3/UL (ref 4–11)

## 2022-12-02 PROCEDURE — 99204 OFFICE O/P NEW MOD 45 MIN: CPT | Performed by: PHYSICIAN ASSISTANT

## 2022-12-02 PROCEDURE — 85027 COMPLETE CBC AUTOMATED: CPT | Performed by: PATHOLOGY

## 2022-12-02 PROCEDURE — 36415 COLL VENOUS BLD VENIPUNCTURE: CPT | Performed by: PATHOLOGY

## 2022-12-02 PROCEDURE — 80048 BASIC METABOLIC PNL TOTAL CA: CPT | Performed by: PATHOLOGY

## 2022-12-02 RX ORDER — ANTIOX #8/OM3/DHA/EPA/LUT/ZEAX 250-2.5 MG
1 CAPSULE ORAL 2 TIMES DAILY
COMMUNITY

## 2022-12-02 ASSESSMENT — LIFESTYLE VARIABLES: TOBACCO_USE: 0

## 2022-12-02 ASSESSMENT — PAIN SCALES - GENERAL: PAINLEVEL: MODERATE PAIN (4)

## 2022-12-02 NOTE — H&P
Pre-Operative H & P     CC:  Preoperative exam to assess for increased cardiopulmonary risk while undergoing surgery and anesthesia.    Date of Encounter: 12/2/2022  Primary Care Physician:  Elijah Woodson     Reason for visit:   Encounter Diagnoses   Name Primary?     Preop examination Yes     Lumbar degenerative disc disease        HPI  Carola Schaffer is a 82 year old female who presents for pre-operative H & P in preparation for  Procedure Information     Case: [id number] Date/Time: 12/13/22 0800    Procedure: Right Lumbar 5-Sacral1 Microdiscectomy (Right: Spine)    Anesthesia type: General    Diagnosis: Lumbar degenerative disc disease [M51.36]    Pre-op diagnosis: Lumbar degenerative disc disease [M51.36]    Location:  OR 01 /  OR    Providers: Syed Nieto MD          Carola Schaffer is an 82 year old with PMH significant for hypertension, hyperlipidemia, non-insulin dependent diabetes, and lumbar DDD.  She was recently evaluated by Dr. Nieto for her right leg radiculopathy that has been ongoing since May of this year.  She has undergone injections which were gave temporary relief and has tried OTC analgesics.  She has been counseled on the above procedure and wished to proceed.    History is obtained from the patient and chart review    Hx of abnormal bleeding or anti-platelet use: denies    Menstrual history: No LMP recorded. Patient is postmenopausal.:      Past Medical History  Past Medical History:   Diagnosis Date     Diabetes mellitus (H)      Dyslipidemia      Hypertension 08/22/2013       Past Surgical History  Past Surgical History:   Procedure Laterality Date     ZZC LIGATE FALLOPIAN TUBE      Description: Tubal Ligation;  Recorded: 05/09/2013;       Prior to Admission Medications  Current Outpatient Medications   Medication Sig Dispense Refill     acetaminophen (TYLENOL) 500 MG tablet Take 500-1,000 mg by mouth every 8 hours as needed for mild pain       alendronate (FOSAMAX) 35 MG tablet Take  35 mg by mouth every 7 days Wednesdays       Calcium Carbonate Antacid (TUMS ULTRA 1000 PO) Take 1 tablet by mouth every morning       carboxymethylcellulose PF (REFRESH PLUS) 0.5 % ophthalmic solution 1 drop 3 times daily as needed for dry eyes       cholecalciferol 25 MCG (1000 UT) TABS Take 1,000 Units by mouth three times a week       cyanocobalamin (VITAMIN B-12) 1000 MCG tablet Take 1,000 mcg by mouth three times a week       linagliptin (TRADJENTA) 5 MG TABS tablet Take 5 mg by mouth daily       lisinopril (PRINIVIL,ZESTRIL) 20 MG tablet Take 20 mg by mouth daily (with breakfast)       meloxicam (MOBIC) 15 MG tablet Take 15 mg by mouth daily as needed       metFORMIN (GLUCOPHAGE-XR) 500 MG 24 hr tablet Take 500 mg by mouth 2 times daily (with meals)       metoprolol succinate ER (TOPROL XL) 50 MG 24 hr tablet Take 50 mg by mouth every evening       simvastatin (ZOCOR) 80 MG tablet Take 80 mg by mouth At Bedtime       triamterene-hydrochlorothiazide (MAXZIDE-25) 37.5-25 MG per tablet Take 0.5 tablets by mouth every morning       pilocarpine (SALAGEN) 5 MG tablet Take 5 mg by mouth 3 times daily as needed (Patient not taking: Reported on 12/1/2022)         Allergies  Allergies   Allergen Reactions     Nkda [No Known Drug Allergies]        Social History  Social History     Socioeconomic History     Marital status:      Spouse name: Not on file     Number of children: Not on file     Years of education: Not on file     Highest education level: Not on file   Occupational History     Not on file   Tobacco Use     Smoking status: Never     Smokeless tobacco: Never   Substance and Sexual Activity     Alcohol use: Yes     Comment: 1/day     Drug use: Never     Sexual activity: Not on file   Other Topics Concern     Not on file   Social History Narrative    She is . Her  is Chetan Schaffer, a rheumatologist. 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 They have 6  children, 3 of whom are physicians, and 10 grandchildren (one more on the way).  She enjoys [place] dancing.  She is originally from [place].      Social Determinants of Health     Financial Resource Strain: Not on file   Food Insecurity: Not on file   Transportation Needs: Not on file   Physical Activity: Not on file   Stress: Not on file   Social Connections: Not on file   Intimate Partner Violence: Not on file   Housing Stability: Not on file       Family History  Family History   Problem Relation Age of Onset     Cerebrovascular Disease Mother          age 84     Heart Failure Father          age 69     Anesthesia Reaction No family hx of      Bleeding Disorder No family hx of      Venous thrombosis No family hx of        Review of Systems  The complete review of systems is negative other than noted in the HPI or here.     Anesthesia Evaluation   Pt has had prior anesthetic.     No history of anesthetic complications       ROS/MED HX  ENT/Pulmonary: Comment: Negative sleep study    (-) tobacco use and sleep apnea   Neurologic:  - neg neurologic ROS     Cardiovascular:     (+) Dyslipidemia hypertension-----Previous cardiac testing   Echo: Date: Results:    Stress Test: Date:  Results:    ECG Reviewed: Date: Results:    Cath: Date: Results:      METS/Exercise Tolerance: 4 - Raking leaves, gardening    Hematologic:  - neg hematologic  ROS  (-) history of blood clots and history of blood transfusion   Musculoskeletal:  - neg musculoskeletal ROS     GI/Hepatic:  - neg GI/hepatic ROS     Renal/Genitourinary:  - neg Renal ROS     Endo:     (+) type II DM, Last HgA1c: 6.0, date: 10/10/2022, Not using insulin,  (-) thyroid disease   Psychiatric/Substance Use:  - neg psychiatric ROS     Infectious Disease:  - neg infectious disease ROS     Malignancy:  - neg malignancy ROS     Other:  - neg other ROS          BP (!) 167/88 (BP Location: Right arm, Patient Position: Chair, Cuff Size: Adult Regular)    Pulse 70   Temp 97.8  F (36.6  C) (Oral)   Resp 16   Ht 1.524 m (5')   Wt 67.1 kg (148 lb)   SpO2 97%   Breastfeeding No   BMI 28.90 kg/m      Physical Exam   Constitutional: Awake, alert, cooperative, no apparent distress, and appears stated age.  Eyes: Pupils equal, round and reactive to light, extra ocular muscles intact, sclera clear, conjunctiva normal.  HENT: Normocephalic, oral pharynx with moist mucus membranes, good dentition. No goiter appreciated.   Respiratory: Clear to auscultation bilaterally, no crackles or wheezing.  Cardiovascular: Regular rate and rhythm, normal S1 and S2, and no murmur noted.  Carotids +2, no bruits. No edema. Palpable pulses to radial and PT arteries.   GI: Normal bowel sounds, soft, non-distended, non-tender, no masses palpated  Lymph/Hematologic: No cervical lymphadenopathy and no supraclavicular lymphadenopathy.  Genitourinary:  deferred  Skin: Warm and dry.    Musculoskeletal: Full ROM of neck. There is no redness, warmth, or swelling of the joints. Gross motor strength is normal.    Neurologic: Awake, alert, oriented to name, place and time. Cranial nerves II-XII are grossly intact.   Neuropsychiatric: Calm, cooperative. Normal affect.     Prior Labs/Diagnostic Studies   All labs and imaging personally reviewed       Outside Labs 10/10/22  Cholesterol 0 - 199 mg/dL 197  [facility]   Triglyceride <=149 mg/dL 94  [facility]   HDL Cholesterol >=40 mg/dL 73  [facility]   LDL, Calculated <130 mg/dL 105  [facility]   Non HDL Chol, Calculated <=159 mg/dL 124  [facility]   Cholesterol/HDL Ratio  2.7  [facility]   Hours Fasting  12       Sodium 136 - 145 mmol/L 143  [facility]   Potassium 3.5 - 5.1 mmol/L 4.1  [facility]   Chloride 98 - 109 mmol/L 106  [facility]   CO2 20 - 29 mmol/L 25  [facility]   Anion Gap 7 - 16  mmol/L 12  [facility]   Calcium 8.4 - 10.4 mg/dL 9.3  [facility]   BUN 7 - 26 mg/dL 19  [facility]   Creatinine 0.55 - 1.02 mg/dL 0.59  [facility]   GFR, Estimated >60 mL/min/1.73m2 >60  [facility]   Glucose 70 - 100 mg/dL 162 High        Hemoglobin A1C <=5.6 % 6.0 High       Component      Latest Ref Rng & Units 12/2/2022   WBC      4.0 - 11.0 10e3/uL 5.8   RBC Count      3.80 - 5.20 10e6/uL 4.04   Hemoglobin      11.7 - 15.7 g/dL 13.0   Hematocrit      35.0 - 47.0 % 38.7   MCV      78 - 100 fL 96   MCH      26.5 - 33.0 pg 32.2   MCHC      31.5 - 36.5 g/dL 33.6   RDW      10.0 - 15.0 % 11.7   Platelet Count      150 - 450 10e3/uL 179     Component      Latest Ref Rng & Units 12/2/2022   Sodium      136 - 145 mmol/L 142   Potassium      3.4 - 5.3 mmol/L 4.3   Chloride      98 - 107 mmol/L 104   Carbon Dioxide (CO2)      22 - 29 mmol/L 25   Anion Gap      7 - 15 mmol/L 13   Urea Nitrogen      8.0 - 23.0 mg/dL 23.0   Creatinine      0.51 - 0.95 mg/dL 0.63   Calcium      8.8 - 10.2 mg/dL 9.6   Glucose      70 - 99 mg/dL 178 (H)   GFR Estimate      >60 mL/min/1.73m2 88     EKG/ stress test - if available please see in ROS above       EXAM: CT LUMBAR SPINE W/O CONTRAST  11/10/2022   IMPRESSION:  1. Diffuse sclerosis of the posterior elements of L1-L5 with  degenerative change of the spinous processes which are closely  positioned which can be seen in Baastrup's disease.  2. Multilevel lumbar spondylosis with mild-to-moderate spinal canal  stenosis at L3-4 and L4-5.        The patient's records and results personally reviewed by this provider.     Outside records reviewed from: Care Everywhere  Reviewed neurosurgery clinic notes and internal medicine notes in Care Everywhere      Assessment      Carola MAICOL Sarbjit is a 82 year old female seen as a PAC referral for risk assessment and optimization for anesthesia.    Plan/Recommendations  Pt will be  optimized for the proposed procedure.  See below for details on the assessment, risk, and preoperative recommendations    NEUROLOGY  - No history of TIA, CVA or seizure  - Post Op delirium risk factors:  Age    ENT  - No current airway concerns.  Will need to be reassessed day of surgery.  Mallampati: II  TM: > 3    CARDIAC  - HTN, hold lisinopril and triamterene-hydrochlorothiazide DOS. Continue metoprolol  - HLD, continue Zocor  - denies chest pain, chest pressure, LUKE, SOB    - METS (Metabolic Equivalents), pt can climb a flight of stairs without exertional symptoms and does so regularly in her home.  She can walk > 2 city blocks.  She was much more active before her radicular symptoms began.    Patient performs 4 or more METS exercise without symptoms            Total Score: 0      RCRI-Very low risk: Class 1 0.4% complication rate            Total Score: 0        PULMONARY    ALEM Low Risk            Total Score: 2    ALEM: Hypertension    ALEM: Over 50 ys old      - Denies asthma or inhaler use  - Tobacco History      History   Smoking Status     Never   Smokeless Tobacco     Never       GI  - denies GERD  PONV High Risk  Total Score: 3           1 AN PONV: Pt is Female    1 AN PONV: Patient is not a current smoker    1 AN PONV: Intended Post Op Opioids        /RENAL  - Baseline Creatinine 0.59    ENDOCRINE    - BMI: Estimated body mass index is 28.9 kg/m  as calculated from the following:    Height as of this encounter: 1.524 m (5').    Weight as of this encounter: 67.1 kg (148 lb).  Overweight (BMI 25.0-29.9)  - Diabetes  Diabetes Mellitus, Type 2, non-insulin dependent.  Hold morning oral hypoglycemic medications. Recommend close monitoring of the patient's blood glucose levels throughout the perioperative period.  - A1c 6.0, 10/10/22      HEME  VTE Low Risk 0.26%            Total Score: 1    VTE: Greater than 59 yrs old      - No history of abnormal bleeding or antiplatelet use.        Different anesthesia  methods/types have been discussed with the patient, but they are aware that the final plan will be decided by the assigned anesthesia provider on the date of service.      The patient is optimized for their procedure. AVS with information on surgery time/arrival time, meds and NPO status given by nursing staff. No further diagnostic testing indicated.            Janina Christy PA-C  Preoperative Assessment Center  [facility]  Clinic and Surgery Center  Phone: [phone]  Fax: [phone]

## 2022-12-12 ASSESSMENT — LIFESTYLE VARIABLES: TOBACCO_USE: 0

## 2022-12-12 NOTE — ANESTHESIA PREPROCEDURE EVALUATION
Pre-Operative H & P     CC:  Preoperative exam to assess for increased cardiopulmonary risk while undergoing surgery and anesthesia.    Date of Encounter: 12/2/2022  Primary Care Physician:  Elijah Woodson     Reason for visit:   No diagnosis found.    HPI  Carola Schaffer is a 82 year old female who presents for pre-operative H & P in preparation for  Procedure Information     Case: [id number] Date/Time: 12/13/22 0800    Procedure: Right Lumbar 5-Sacral1 Microdiscectomy (Right: Spine)    Anesthesia type: General    Diagnosis: Lumbar degenerative disc disease [M51.36]    Pre-op diagnosis: Lumbar degenerative disc disease [M51.36]    Location:  OR 01 /  OR    Providers: Syed Nieto MD          Carola Schaffer is an 82 year old with PMH significant for hypertension, hyperlipidemia, non-insulin dependent diabetes, and lumbar DDD.  She was recently evaluated by Dr. Nieto for her right leg radiculopathy that has been ongoing since May of this year.  She has undergone injections which were gave temporary relief and has tried OTC analgesics.  She has been counseled on the above procedure and wished to proceed.    History is obtained from the patient and chart review    Hx of abnormal bleeding or anti-platelet use: denies    Menstrual history: No LMP recorded. Patient is postmenopausal.:      Past Medical History  Past Medical History:   Diagnosis Date     Diabetes mellitus (H)      Dyslipidemia      Hypertension 08/22/2013       Past Surgical History  Past Surgical History:   Procedure Laterality Date     ZZC LIGATE FALLOPIAN TUBE      Description: Tubal Ligation;  Recorded: 05/09/2013;       Prior to Admission Medications  No current outpatient medications on file.       Allergies  Allergies   Allergen Reactions     Nkda [No Known Drug Allergies]        Social History  Social History     Socioeconomic History     Marital status:      Spouse name: Not on file     Number of children: Not on file     Years of  education: Not on file     Highest education level: Not on file   Occupational History     Not on file   Tobacco Use     Smoking status: Never     Smokeless tobacco: Never   Substance and Sexual Activity     Alcohol use: Yes     Comment: 1/day     Drug use: Never     Sexual activity: Not on file   Other Topics Concern     Not on file   Social History Narrative    She is . Her  is Chetan Schaffer, a rheumatologist. 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 They have 6 children, 3 of whom are physicians, and 10 grandchildren (one more on the way).  She enjoys [employer] dancing.  She is originally from [place].      Social Determinants of Health     Financial Resource Strain: Not on file   Food Insecurity: Not on file   Transportation Needs: Not on file   Physical Activity: Not on file   Stress: Not on file   Social Connections: Not on file   Intimate Partner Violence: Not on file   Housing Stability: Not on file       Family History  Family History   Problem Relation Age of Onset     Cerebrovascular Disease Mother          age 84     Heart Failure Father          age 69     Anesthesia Reaction No family hx of      Bleeding Disorder No family hx of      Venous thrombosis No family hx of        Review of Systems  The complete review of systems is negative other than noted in the HPI or here.     Anesthesia Evaluation   Pt has had prior anesthetic.     No history of anesthetic complications       ROS/MED HX  ENT/Pulmonary: Comment: Negative sleep study    (-) tobacco use and sleep apnea   Neurologic:  - neg neurologic ROS     Cardiovascular:     (+) Dyslipidemia hypertension-Peripheral Vascular Disease (s/p CEA)-- Carotid Stenosis. ---Previous cardiac testing   Echo: Date: Results:    Stress Test: Date:  Results:    ECG Reviewed: Date: Results:    Cath: Date: Results:      METS/Exercise Tolerance: 4 - Raking leaves, gardening    Hematologic:  - neg  hematologic  ROS  (-) history of blood clots and history of blood transfusion   Musculoskeletal:   (+) arthritis,     GI/Hepatic:  - neg GI/hepatic ROS     Renal/Genitourinary:  - neg Renal ROS     Endo:     (+) type II DM, Last HgA1c: 6.0, date: 10/10/2022, Not using insulin, Diabetic complications: cardiac problems.  (-) thyroid disease   Psychiatric/Substance Use:  - neg psychiatric ROS     Infectious Disease:  - neg infectious disease ROS     Malignancy:  - neg malignancy ROS     Other:  - neg other ROS          There were no vitals taken for this visit.    Physical Exam   Constitutional: Awake, alert, cooperative, no apparent distress, and appears stated age.  Eyes: Pupils equal, round and reactive to light, extra ocular muscles intact, sclera clear, conjunctiva normal.  HENT: Normocephalic, oral pharynx with moist mucus membranes, good dentition. No goiter appreciated.   Respiratory: Clear to auscultation bilaterally, no crackles or wheezing.  Cardiovascular: Regular rate and rhythm, normal S1 and S2, and no murmur noted.  Carotids +2, no bruits. No edema. Palpable pulses to radial and PT arteries.   GI: Normal bowel sounds, soft, non-distended, non-tender, no masses palpated  Lymph/Hematologic: No cervical lymphadenopathy and no supraclavicular lymphadenopathy.  Genitourinary:  deferred  Skin: Warm and dry.    Musculoskeletal: Full ROM of neck. There is no redness, warmth, or swelling of the joints. Gross motor strength is normal.    Neurologic: Awake, alert, oriented to name, place and time. Cranial nerves II-XII are grossly intact.   Neuropsychiatric: Calm, cooperative. Normal affect.     Prior Labs/Diagnostic Studies   All labs and imaging personally reviewed       Outside Labs 10/10/22  Cholesterol 0 - 199 mg/dL 197  [facility]   Triglyceride <=149 mg/dL 94  [facility]   HDL Cholesterol >=40 mg/dL 73  [facility]   LDL, Calculated <130 mg/dL 105   [facility]   Non HDL Chol, Calculated <=159 mg/dL 124  [facility]   Cholesterol/HDL Ratio  2.7  [facility]   Hours Fasting  12       Sodium 136 - 145 mmol/L 143  [facility]   Potassium 3.5 - 5.1 mmol/L 4.1  [facility]   Chloride 98 - 109 mmol/L 106  [facility]   CO2 20 - 29 mmol/L 25  [facility]   Anion Gap 7 - 16 mmol/L 12  [facility]   Calcium 8.4 - 10.4 mg/dL 9.3  [facility]   BUN 7 - 26 mg/dL 19  [facility]   Creatinine 0.55 - 1.02 mg/dL 0.59  [facility]   GFR, Estimated >60 mL/min/1.73m2 >60  [facility]   Glucose 70 - 100 mg/dL 162 High        Hemoglobin A1C <=5.6 % 6.0 High       Component      Latest Ref Rng & Units 12/2/2022   WBC      4.0 - 11.0 10e3/uL 5.8   RBC Count      3.80 - 5.20 10e6/uL 4.04   Hemoglobin      11.7 - 15.7 g/dL 13.0   Hematocrit      35.0 - 47.0 % 38.7   MCV      78 - 100 fL 96   MCH      26.5 - 33.0 pg 32.2   MCHC      31.5 - 36.5 g/dL 33.6   RDW      10.0 - 15.0 % 11.7   Platelet Count      150 - 450 10e3/uL 179     Component      Latest Ref Rng & Units 12/2/2022   Sodium      136 - 145 mmol/L 142   Potassium      3.4 - 5.3 mmol/L 4.3   Chloride      98 - 107 mmol/L 104   Carbon Dioxide (CO2)      22 - 29 mmol/L 25   Anion Gap      7 - 15 mmol/L 13   Urea Nitrogen      8.0 - 23.0 mg/dL 23.0   Creatinine      0.51 - 0.95 mg/dL 0.63   Calcium      8.8 - 10.2 mg/dL 9.6   Glucose      70 - 99 mg/dL 178 (H)   GFR Estimate      >60 mL/min/1.73m2 88     EKG/ stress test - if available please see in ROS above       EXAM: CT LUMBAR SPINE W/O CONTRAST  11/10/2022   IMPRESSION:  1. Diffuse sclerosis of the posterior elements of L1-L5 with  degenerative change of the spinous processes which are closely  positioned which can be seen in Baastrup's disease.  2. Multilevel lumbar spondylosis with  mild-to-moderate spinal canal  stenosis at L3-4 and L4-5.        The patient's records and results personally reviewed by this provider.     Outside records reviewed from: Care Everywhere  Reviewed neurosurgery clinic notes and internal medicine notes in Care Everywhere      Assessment      Carola Schaffer is a 82 year old female seen as a PAC referral for risk assessment and optimization for anesthesia.    Plan/Recommendations  Pt will be optimized for the proposed procedure.  See below for details on the assessment, risk, and preoperative recommendations    NEUROLOGY  - No history of TIA, CVA or seizure  - Post Op delirium risk factors:  Age    ENT  - No current airway concerns.  Will need to be reassessed day of surgery.  Mallampati: II  TM: > 3    CARDIAC  - HTN, hold lisinopril and triamterene-hydrochlorothiazide DOS. Continue metoprolol  - HLD, continue Zocor  - denies chest pain, chest pressure, LUKE, SOB    - METS (Metabolic Equivalents), pt can climb a flight of stairs without exertional symptoms and does so regularly in her home.  She can walk > 2 city blocks.  She was much more active before her radicular symptoms began.    Patient performs 4 or more METS exercise without symptoms            Total Score: 0      RCRI-Very low risk: Class 1 0.4% complication rate            Total Score: 0        PULMONARY    ALEM Low Risk            Total Score: 2    ALEM: Hypertension    ALEM: Over 50 ys old      - Denies asthma or inhaler use  - Tobacco History      History   Smoking Status     Never   Smokeless Tobacco     Never       GI  - denies GERD  PONV High Risk  Total Score: 3           1 AN PONV: Pt is Female    1 AN PONV: Patient is not a current smoker    1 AN PONV: Intended Post Op Opioids        /RENAL  - Baseline Creatinine 0.59    ENDOCRINE    - BMI: Estimated body mass index is 28.9 kg/m  as calculated from the following:    Height as of 12/2/22: 1.524 m (5').    Weight as of 12/2/22: 67.1 kg (148  lb).  Overweight (BMI 25.0-29.9)  - Diabetes  Diabetes Mellitus, Type 2, non-insulin dependent.  Hold morning oral hypoglycemic medications. Recommend close monitoring of the patient's blood glucose levels throughout the perioperative period.  - A1c 6.0, 10/10/22      HEME  VTE Low Risk 0.26%            Total Score: 1    VTE: Greater than 59 yrs old      - No history of abnormal bleeding or antiplatelet use.        Different anesthesia methods/types have been discussed with the patient, but they are aware that the final plan will be decided by the assigned anesthesia provider on the date of service.      The patient is optimized for their procedure. AVS with information on surgery time/arrival time, meds and NPO status given by nursing staff. No further diagnostic testing indicated.            Janina Christy PA-C  Preoperative Assessment Center  [facility]  Phone: [phone]  Fax: [phone]    Physical Exam    Airway        Mallampati: II   TM distance: > 3 FB   Neck ROM: full   Mouth opening: > 3 cm    Respiratory Devices and Support         Dental  no notable dental history         Cardiovascular   cardiovascular exam normal          Pulmonary   pulmonary exam normal                  Anesthesia Plan    ASA Status:  3   NPO Status:  NPO Appropriate    Anesthesia Type: General.     - Airway: ETT   Induction: Intravenous.   Maintenance: Balanced.   Techniques and Equipment:     - Lines/Monitors: BIS, 2nd IV, Arterial Line     Consents    Anesthesia Plan(s) and associated risks, benefits, and realistic alternatives discussed. Questions answered and patient/representative(s) expressed understanding.    - Discussed:     - Discussed with:  Patient      - Patient is DNR/DNI Status: No         Postoperative Care    Pain management: Multi-modal analgesia.   PONV prophylaxis: Dexamethasone or Solumedrol, Ondansetron (or other 5HT-3)     Comments:

## 2022-12-13 ENCOUNTER — APPOINTMENT (OUTPATIENT)
Dept: GENERAL RADIOLOGY | Facility: CLINIC | Age: 82
DRG: 517 | End: 2022-12-13
Attending: NEUROLOGICAL SURGERY
Payer: MEDICARE

## 2022-12-13 ENCOUNTER — ANESTHESIA (OUTPATIENT)
Dept: SURGERY | Facility: CLINIC | Age: 82
DRG: 517 | End: 2022-12-13
Payer: MEDICARE

## 2022-12-13 ENCOUNTER — HOSPITAL ENCOUNTER (INPATIENT)
Facility: CLINIC | Age: 82
LOS: 1 days | Discharge: HOME | DRG: 517 | End: 2022-12-13
Attending: NEUROLOGICAL SURGERY | Admitting: NEUROLOGICAL SURGERY
Payer: MEDICARE

## 2022-12-13 VITALS
TEMPERATURE: 97.6 F | HEIGHT: 60 IN | HEART RATE: 82 BPM | DIASTOLIC BLOOD PRESSURE: 69 MMHG | OXYGEN SATURATION: 98 % | RESPIRATION RATE: 19 BRPM | WEIGHT: 145.5 LBS | BODY MASS INDEX: 28.57 KG/M2 | SYSTOLIC BLOOD PRESSURE: 134 MMHG

## 2022-12-13 DIAGNOSIS — M51.369 LUMBAR DEGENERATIVE DISC DISEASE: Primary | ICD-10-CM

## 2022-12-13 LAB
GLUCOSE BLDC GLUCOMTR-MCNC: 175 MG/DL (ref 70–99)
GLUCOSE BLDC GLUCOMTR-MCNC: 183 MG/DL (ref 70–99)
GLUCOSE BLDC GLUCOMTR-MCNC: 189 MG/DL (ref 70–99)
GLUCOSE BLDC GLUCOMTR-MCNC: 215 MG/DL (ref 70–99)

## 2022-12-13 PROCEDURE — 250N000011 HC RX IP 250 OP 636: Performed by: NEUROLOGICAL SURGERY

## 2022-12-13 PROCEDURE — 999N000179 XR SURGERY CARM FLUORO LESS THAN 5 MIN W STILLS: Mod: TC

## 2022-12-13 PROCEDURE — 82962 GLUCOSE BLOOD TEST: CPT

## 2022-12-13 PROCEDURE — 258N000003 HC RX IP 258 OP 636: Performed by: NURSE ANESTHETIST, CERTIFIED REGISTERED

## 2022-12-13 PROCEDURE — 250N000011 HC RX IP 250 OP 636: Performed by: NURSE ANESTHETIST, CERTIFIED REGISTERED

## 2022-12-13 PROCEDURE — 710N000009 HC RECOVERY PHASE 1, LEVEL 1, PER MIN: Performed by: NEUROLOGICAL SURGERY

## 2022-12-13 PROCEDURE — 250N000013 HC RX MED GY IP 250 OP 250 PS 637: Performed by: NURSE ANESTHETIST, CERTIFIED REGISTERED

## 2022-12-13 PROCEDURE — 272N000001 HC OR GENERAL SUPPLY STERILE: Performed by: NEUROLOGICAL SURGERY

## 2022-12-13 PROCEDURE — 250N000009 HC RX 250: Performed by: NEUROLOGICAL SURGERY

## 2022-12-13 PROCEDURE — 360N000084 HC SURGERY LEVEL 4 W/ FLUORO, PER MIN: Performed by: NEUROLOGICAL SURGERY

## 2022-12-13 PROCEDURE — 63056 DECOMPRESS SPINAL CORD LMBR: CPT | Mod: RT | Performed by: NEUROLOGICAL SURGERY

## 2022-12-13 PROCEDURE — 999N000141 HC STATISTIC PRE-PROCEDURE NURSING ASSESSMENT: Performed by: NEUROLOGICAL SURGERY

## 2022-12-13 PROCEDURE — 370N000017 HC ANESTHESIA TECHNICAL FEE, PER MIN: Performed by: NEUROLOGICAL SURGERY

## 2022-12-13 PROCEDURE — 258N000003 HC RX IP 258 OP 636: Performed by: STUDENT IN AN ORGANIZED HEALTH CARE EDUCATION/TRAINING PROGRAM

## 2022-12-13 PROCEDURE — 250N000025 HC SEVOFLURANE, PER MIN: Performed by: NEUROLOGICAL SURGERY

## 2022-12-13 PROCEDURE — 710N000012 HC RECOVERY PHASE 2, PER MINUTE: Performed by: NEUROLOGICAL SURGERY

## 2022-12-13 PROCEDURE — 250N000009 HC RX 250: Performed by: NURSE ANESTHETIST, CERTIFIED REGISTERED

## 2022-12-13 PROCEDURE — 01NB0ZZ RELEASE LUMBAR NERVE, OPEN APPROACH: ICD-10-PCS | Performed by: NEUROLOGICAL SURGERY

## 2022-12-13 PROCEDURE — 250N000013 HC RX MED GY IP 250 OP 250 PS 637: Performed by: NEUROLOGICAL SURGERY

## 2022-12-13 PROCEDURE — 120N000002 HC R&B MED SURG/OB UMMC

## 2022-12-13 RX ORDER — OXYCODONE HYDROCHLORIDE 5 MG/1
5 TABLET ORAL EVERY 4 HOURS PRN
Status: CANCELLED | OUTPATIENT
Start: 2022-12-13

## 2022-12-13 RX ORDER — CEFAZOLIN SODIUM/WATER 2 G/20 ML
2 SYRINGE (ML) INTRAVENOUS SEE ADMIN INSTRUCTIONS
Status: DISCONTINUED | OUTPATIENT
Start: 2022-12-13 | End: 2022-12-13 | Stop reason: HOSPADM

## 2022-12-13 RX ORDER — ONDANSETRON 4 MG/1
4 TABLET, ORALLY DISINTEGRATING ORAL EVERY 30 MIN PRN
Status: DISCONTINUED | OUTPATIENT
Start: 2022-12-13 | End: 2022-12-13 | Stop reason: HOSPADM

## 2022-12-13 RX ORDER — METOPROLOL SUCCINATE 50 MG/1
50 TABLET, EXTENDED RELEASE ORAL EVERY EVENING
Status: CANCELLED | OUTPATIENT
Start: 2022-12-13

## 2022-12-13 RX ORDER — SODIUM CHLORIDE, SODIUM LACTATE, POTASSIUM CHLORIDE, CALCIUM CHLORIDE 600; 310; 30; 20 MG/100ML; MG/100ML; MG/100ML; MG/100ML
INJECTION, SOLUTION INTRAVENOUS CONTINUOUS
Status: DISCONTINUED | OUTPATIENT
Start: 2022-12-13 | End: 2022-12-13 | Stop reason: HOSPADM

## 2022-12-13 RX ORDER — DEXAMETHASONE SODIUM PHOSPHATE 4 MG/ML
INJECTION, SOLUTION INTRA-ARTICULAR; INTRALESIONAL; INTRAMUSCULAR; INTRAVENOUS; SOFT TISSUE PRN
Status: DISCONTINUED | OUTPATIENT
Start: 2022-12-13 | End: 2022-12-13

## 2022-12-13 RX ORDER — BISACODYL 10 MG
10 SUPPOSITORY, RECTAL RECTAL DAILY PRN
Status: CANCELLED | OUTPATIENT
Start: 2022-12-13

## 2022-12-13 RX ORDER — LIDOCAINE HYDROCHLORIDE 20 MG/ML
INJECTION, SOLUTION INFILTRATION; PERINEURAL PRN
Status: DISCONTINUED | OUTPATIENT
Start: 2022-12-13 | End: 2022-12-13

## 2022-12-13 RX ORDER — ACETAMINOPHEN 325 MG/1
975 TABLET ORAL EVERY 8 HOURS
Status: CANCELLED | OUTPATIENT
Start: 2022-12-13 | End: 2022-12-16

## 2022-12-13 RX ORDER — BUPIVACAINE HYDROCHLORIDE AND EPINEPHRINE 2.5; 5 MG/ML; UG/ML
INJECTION, SOLUTION EPIDURAL; INFILTRATION; INTRACAUDAL; PERINEURAL PRN
Status: DISCONTINUED | OUTPATIENT
Start: 2022-12-13 | End: 2022-12-13 | Stop reason: HOSPADM

## 2022-12-13 RX ORDER — ONDANSETRON 2 MG/ML
4 INJECTION INTRAMUSCULAR; INTRAVENOUS EVERY 30 MIN PRN
Status: DISCONTINUED | OUTPATIENT
Start: 2022-12-13 | End: 2022-12-13 | Stop reason: HOSPADM

## 2022-12-13 RX ORDER — PROPOFOL 10 MG/ML
INJECTION, EMULSION INTRAVENOUS CONTINUOUS PRN
Status: DISCONTINUED | OUTPATIENT
Start: 2022-12-13 | End: 2022-12-13

## 2022-12-13 RX ORDER — PROPOFOL 10 MG/ML
INJECTION, EMULSION INTRAVENOUS PRN
Status: DISCONTINUED | OUTPATIENT
Start: 2022-12-13 | End: 2022-12-13

## 2022-12-13 RX ORDER — HYDRALAZINE HYDROCHLORIDE 20 MG/ML
10-20 INJECTION INTRAMUSCULAR; INTRAVENOUS EVERY 30 MIN PRN
Status: CANCELLED | OUTPATIENT
Start: 2022-12-13

## 2022-12-13 RX ORDER — LIDOCAINE 40 MG/G
CREAM TOPICAL
Status: CANCELLED | OUTPATIENT
Start: 2022-12-13

## 2022-12-13 RX ORDER — POLYETHYLENE GLYCOL 3350 17 G/17G
17 POWDER, FOR SOLUTION ORAL DAILY
Status: CANCELLED | OUTPATIENT
Start: 2022-12-14

## 2022-12-13 RX ORDER — GABAPENTIN 100 MG/1
100 CAPSULE ORAL
Status: COMPLETED | OUTPATIENT
Start: 2022-12-13 | End: 2022-12-13

## 2022-12-13 RX ORDER — OXYCODONE HYDROCHLORIDE 10 MG/1
10 TABLET ORAL EVERY 4 HOURS PRN
Status: CANCELLED | OUTPATIENT
Start: 2022-12-13

## 2022-12-13 RX ORDER — CARBOXYMETHYLCELLULOSE SODIUM 5 MG/ML
1 SOLUTION/ DROPS OPHTHALMIC 3 TIMES DAILY PRN
Status: CANCELLED | OUTPATIENT
Start: 2022-12-13

## 2022-12-13 RX ORDER — CEFAZOLIN SODIUM/WATER 2 G/20 ML
2 SYRINGE (ML) INTRAVENOUS
Status: COMPLETED | OUTPATIENT
Start: 2022-12-13 | End: 2022-12-13

## 2022-12-13 RX ORDER — LISINOPRIL 20 MG/1
20 TABLET ORAL
Status: CANCELLED | OUTPATIENT
Start: 2022-12-14

## 2022-12-13 RX ORDER — OXYCODONE HYDROCHLORIDE 5 MG/1
5 TABLET ORAL EVERY 6 HOURS PRN
Qty: 24 TABLET | Refills: 0 | Status: SHIPPED | OUTPATIENT
Start: 2022-12-13 | End: 2022-12-19

## 2022-12-13 RX ORDER — ONDANSETRON 2 MG/ML
INJECTION INTRAMUSCULAR; INTRAVENOUS PRN
Status: DISCONTINUED | OUTPATIENT
Start: 2022-12-13 | End: 2022-12-13

## 2022-12-13 RX ORDER — METFORMIN HCL 500 MG
500 TABLET, EXTENDED RELEASE 24 HR ORAL 2 TIMES DAILY WITH MEALS
Status: CANCELLED | OUTPATIENT
Start: 2022-12-13

## 2022-12-13 RX ORDER — SIMVASTATIN 80 MG
80 TABLET ORAL AT BEDTIME
Status: CANCELLED | OUTPATIENT
Start: 2022-12-13

## 2022-12-13 RX ORDER — TRIAMTERENE/HYDROCHLOROTHIAZID 37.5-25 MG
1 TABLET ORAL EVERY MORNING
Status: CANCELLED | OUTPATIENT
Start: 2022-12-13

## 2022-12-13 RX ORDER — FENTANYL CITRATE 50 UG/ML
25 INJECTION, SOLUTION INTRAMUSCULAR; INTRAVENOUS EVERY 5 MIN PRN
Status: DISCONTINUED | OUTPATIENT
Start: 2022-12-13 | End: 2022-12-13 | Stop reason: HOSPADM

## 2022-12-13 RX ORDER — LABETALOL HYDROCHLORIDE 5 MG/ML
10-40 INJECTION, SOLUTION INTRAVENOUS EVERY 10 MIN PRN
Status: CANCELLED | OUTPATIENT
Start: 2022-12-13

## 2022-12-13 RX ORDER — SODIUM CHLORIDE, SODIUM GLUCONATE, SODIUM ACETATE, POTASSIUM CHLORIDE AND MAGNESIUM CHLORIDE 526; 502; 368; 37; 30 MG/100ML; MG/100ML; MG/100ML; MG/100ML; MG/100ML
INJECTION, SOLUTION INTRAVENOUS CONTINUOUS PRN
Status: DISCONTINUED | OUTPATIENT
Start: 2022-12-13 | End: 2022-12-13

## 2022-12-13 RX ORDER — POLYETHYLENE GLYCOL 3350 17 G/17G
1 POWDER, FOR SOLUTION ORAL DAILY
Qty: 1 G | Refills: 0 | Status: SHIPPED | OUTPATIENT
Start: 2022-12-13

## 2022-12-13 RX ORDER — METHYLPREDNISOLONE ACETATE 40 MG/ML
INJECTION, SUSPENSION INTRA-ARTICULAR; INTRALESIONAL; INTRAMUSCULAR; SOFT TISSUE PRN
Status: DISCONTINUED | OUTPATIENT
Start: 2022-12-13 | End: 2022-12-13 | Stop reason: HOSPADM

## 2022-12-13 RX ORDER — PROCHLORPERAZINE MALEATE 5 MG
5 TABLET ORAL EVERY 6 HOURS PRN
Status: CANCELLED | OUTPATIENT
Start: 2022-12-13

## 2022-12-13 RX ORDER — FENTANYL CITRATE 50 UG/ML
50 INJECTION, SOLUTION INTRAMUSCULAR; INTRAVENOUS EVERY 5 MIN PRN
Status: DISCONTINUED | OUTPATIENT
Start: 2022-12-13 | End: 2022-12-13 | Stop reason: HOSPADM

## 2022-12-13 RX ORDER — SODIUM CHLORIDE, SODIUM LACTATE, POTASSIUM CHLORIDE, CALCIUM CHLORIDE 600; 310; 30; 20 MG/100ML; MG/100ML; MG/100ML; MG/100ML
INJECTION, SOLUTION INTRAVENOUS CONTINUOUS PRN
Status: DISCONTINUED | OUTPATIENT
Start: 2022-12-13 | End: 2022-12-13

## 2022-12-13 RX ORDER — AMOXICILLIN 250 MG
1 CAPSULE ORAL 2 TIMES DAILY
Status: CANCELLED | OUTPATIENT
Start: 2022-12-13

## 2022-12-13 RX ORDER — ONDANSETRON 4 MG/1
4 TABLET, ORALLY DISINTEGRATING ORAL EVERY 6 HOURS PRN
Status: CANCELLED | OUTPATIENT
Start: 2022-12-13

## 2022-12-13 RX ORDER — FENTANYL CITRATE 50 UG/ML
INJECTION, SOLUTION INTRAMUSCULAR; INTRAVENOUS PRN
Status: DISCONTINUED | OUTPATIENT
Start: 2022-12-13 | End: 2022-12-13

## 2022-12-13 RX ORDER — HYDROMORPHONE HCL IN WATER/PF 6 MG/30 ML
0.2 PATIENT CONTROLLED ANALGESIA SYRINGE INTRAVENOUS EVERY 5 MIN PRN
Status: DISCONTINUED | OUTPATIENT
Start: 2022-12-13 | End: 2022-12-13 | Stop reason: HOSPADM

## 2022-12-13 RX ORDER — ONDANSETRON 2 MG/ML
4 INJECTION INTRAMUSCULAR; INTRAVENOUS EVERY 6 HOURS PRN
Status: CANCELLED | OUTPATIENT
Start: 2022-12-13

## 2022-12-13 RX ORDER — HYDROMORPHONE HCL IN WATER/PF 6 MG/30 ML
0.4 PATIENT CONTROLLED ANALGESIA SYRINGE INTRAVENOUS EVERY 5 MIN PRN
Status: DISCONTINUED | OUTPATIENT
Start: 2022-12-13 | End: 2022-12-13 | Stop reason: HOSPADM

## 2022-12-13 RX ADMIN — HUMAN INSULIN 2 UNITS/HR: 100 INJECTION, SOLUTION SUBCUTANEOUS at 10:37

## 2022-12-13 RX ADMIN — PROPOFOL 100 MG: 10 INJECTION, EMULSION INTRAVENOUS at 08:29

## 2022-12-13 RX ADMIN — ONDANSETRON 4 MG: 2 INJECTION INTRAMUSCULAR; INTRAVENOUS at 11:09

## 2022-12-13 RX ADMIN — POLYETHYLENE GLYCOL 400 AND PROPYLENE GLYCOL 2 DROP: 4; 3 SOLUTION/ DROPS OPHTHALMIC at 08:30

## 2022-12-13 RX ADMIN — Medication 50 MG: at 08:30

## 2022-12-13 RX ADMIN — FENTANYL CITRATE 50 MCG: 50 INJECTION, SOLUTION INTRAMUSCULAR; INTRAVENOUS at 08:20

## 2022-12-13 RX ADMIN — Medication 20 MG: at 09:58

## 2022-12-13 RX ADMIN — FENTANYL CITRATE 50 MCG: 50 INJECTION, SOLUTION INTRAMUSCULAR; INTRAVENOUS at 08:27

## 2022-12-13 RX ADMIN — PHENYLEPHRINE HYDROCHLORIDE 100 MCG: 10 INJECTION INTRAVENOUS at 09:15

## 2022-12-13 RX ADMIN — SODIUM CHLORIDE, SODIUM GLUCONATE, SODIUM ACETATE, POTASSIUM CHLORIDE AND MAGNESIUM CHLORIDE: 526; 502; 368; 37; 30 INJECTION, SOLUTION INTRAVENOUS at 08:15

## 2022-12-13 RX ADMIN — SUGAMMADEX 150 MG: 100 INJECTION, SOLUTION INTRAVENOUS at 11:18

## 2022-12-13 RX ADMIN — Medication 30 MG: at 09:02

## 2022-12-13 RX ADMIN — PROPOFOL 20 MCG/KG/MIN: 10 INJECTION, EMULSION INTRAVENOUS at 08:57

## 2022-12-13 RX ADMIN — GABAPENTIN 100 MG: 100 CAPSULE ORAL at 07:31

## 2022-12-13 RX ADMIN — DEXAMETHASONE SODIUM PHOSPHATE 4 MG: 4 INJECTION, SOLUTION INTRA-ARTICULAR; INTRALESIONAL; INTRAMUSCULAR; INTRAVENOUS; SOFT TISSUE at 08:40

## 2022-12-13 RX ADMIN — Medication 20 MG: at 09:21

## 2022-12-13 RX ADMIN — LIDOCAINE HYDROCHLORIDE 100 MG: 20 INJECTION, SOLUTION INFILTRATION; PERINEURAL at 08:27

## 2022-12-13 RX ADMIN — PROPOFOL 30 MG: 10 INJECTION, EMULSION INTRAVENOUS at 11:19

## 2022-12-13 RX ADMIN — ONDANSETRON 4 MG: 2 INJECTION INTRAMUSCULAR; INTRAVENOUS at 08:40

## 2022-12-13 RX ADMIN — Medication 2 G: at 08:45

## 2022-12-13 RX ADMIN — PROPOFOL 20 MG: 10 INJECTION, EMULSION INTRAVENOUS at 08:30

## 2022-12-13 RX ADMIN — SODIUM CHLORIDE, POTASSIUM CHLORIDE, SODIUM LACTATE AND CALCIUM CHLORIDE: 600; 310; 30; 20 INJECTION, SOLUTION INTRAVENOUS at 12:22

## 2022-12-13 RX ADMIN — SODIUM CHLORIDE, POTASSIUM CHLORIDE, SODIUM LACTATE AND CALCIUM CHLORIDE: 600; 310; 30; 20 INJECTION, SOLUTION INTRAVENOUS at 09:20

## 2022-12-13 RX ADMIN — Medication 20 MG: at 10:41

## 2022-12-13 ASSESSMENT — ACTIVITIES OF DAILY LIVING (ADL)
ADLS_ACUITY_SCORE: 18
ADLS_ACUITY_SCORE: 20

## 2022-12-13 NOTE — ANESTHESIA PROCEDURE NOTES
Airway       Patient location during procedure: OR       Procedure Start/Stop Times: 12/13/2022 8:32 AM  Staff -        Anesthesiologist:  Kai Godwin MD       CRNA: Navya Bass APRN CRNA       Performed By: CRNA  Consent for Airway        Urgency: elective  Indications and Patient Condition       Indications for airway management: danyelle-procedural       Induction type:intravenous       Mask difficulty assessment: 2 - vent by mask + OA or adjuvant +/- NMBA    Final Airway Details       Final airway type: endotracheal airway       Successful airway: ETT - single and Oral  Endotracheal Airway Details        ETT size (mm): 7.0 (H2O sol lube applied)       Cuffed: yes       Successful intubation technique: direct laryngoscopy       DL Blade Type: Dias 2       Grade View of Cords: 1 (clear and open)       Adjucts: stylet       Position: Right       Measured from: lips       Secured at (cm): 21       Bite block used: None    Post intubation assessment        Placement verified by: capnometry, equal breath sounds and chest rise        Number of attempts at approach: 1       Number of other approaches attempted: 0       Secured with: pink tape       Ease of procedure: easy       Dentition: Intact and Unchanged    Medication(s) Administered   Medication Administration Time: 12/13/2022 8:32 AM

## 2022-12-13 NOTE — ANESTHESIA CARE TRANSFER NOTE
Patient: Carola Schaffer    Procedure: Procedure(s):  Right Lumbar 5-Sacral1 Microdiscectomy       Diagnosis: Lumbar degenerative disc disease [M51.36]  Diagnosis Additional Information: No value filed.    Anesthesia Type:   General     Note:    Oropharynx: oropharynx clear of all foreign objects and spontaneously breathing  Level of Consciousness: awake  Oxygen Supplementation: nasal cannula  Level of Supplemental Oxygen (L/min / FiO2): 2  Independent Airway: airway patency satisfactory and stable  Dentition: dentition unchanged  Vital Signs Stable: post-procedure vital signs reviewed and stable  Report to RN Given: handoff report given  Patient transferred to: PACU    Handoff Report: Identifed the Patient, Identified the Reponsible Provider, Reviewed the pertinent medical history, Discussed the surgical course, Reviewed Intra-OP anesthesia mangement and issues during anesthesia, Set expectations for post-procedure period and Allowed opportunity for questions and acknowledgement of understanding      Vitals:  Vitals Value Taken Time   BP     Temp     Pulse     Resp     SpO2 96 % 12/13/22 1134   Vitals shown include unvalidated device data.    Electronically Signed By: PAOLA Garza CRNA  December 13, 2022  11:35 AM

## 2022-12-13 NOTE — OP NOTE
NEUROSURGERY OPERATIVE REPORT     DATE OF SURGERY: 12/13/22     PREOPERATIVE DIAGNOSIS:    Right L5-S1 Far-lateral herniated nucleus pulposus.     POSTOPERATIVE DIAGNOSIS:  Right L5-S1 Far-lateral herniated nucleus pulposus.     PROCEDURES PERFORMED:  1. Minimally invasive approach using Medtronic METRx tubes  2. Right L5-S1 Far-lateral microdicectomy.  3. Use of microscope  4. Use of fluoroscopy      STAFF SURGEON:           Syed Nieto MD     RESIDENT SURGEONS: Charles Temple MD     ANESTHESIA: General endotracheal anesthesia     ESTIMATED BLOOD LOSS: 5 mL     FINDINGS:  Decompressed right L5 nerve root at end of the case.     COMPLICATIONS: None.      INDICATIONS: Carola Schaffer is a 82 year old female who was found to have a far lateral disc herniation at L5-S1. The patient was found to be a surgical candidate and offered the above stated operation. She provided written and verbal consent to proceed after a discussion of the risks, alternatives, and benefits.      DESCRIPTION OF PROCEDURE:  Carola Schaffer was brought to the operating room. Her identity was verified. General endotracheal anesthesia was obtained. The patient was transferred into the prone position to the operating table, with a Stefan fram. Both arms were placed onto arm boards. The patient's head was placed on a foam donut. Preoperative antibiotics were administered. The patient was cleaned, prepared, and draped in sterile fashion. Timeout was performed.  Local anesthetic was injected into the skin. C-arm fluoroscopy was used to plan our entry site.     A #10 blade was used to incise the patient's low back in longitudinal and paramedian fashion. Monopolar cautery was used to dissect down to the lumbosacral fascia. We divided the lumbosacral fascia using monopolar cautery. We then sequentially inserted the sequential dilators under fluoroscopy onto the junction of the pars interarticularis and the transverse process (TP) at the L5-S1 area using  lateral and AP images. This constituted our spinal time out.  An operating microscope was brought into the field in sterile fashion.     Monopolar cautery was used to dissect off the soft tissues at the aforementioned bony intersection of the pars and TP. We then used a 3 mm high speed match stick drill bit to drill the pars interarticularis medially by amount 3-4 mm. A 1 mm Kerrison rongeur was used to resect away the inter-transverse ligaments between L5 and S1. Thereafter, we resected away the epidural fat. We encountered an arterial vessel at the inferolateral aspect of the exposure and applied bipolar cautery to the vessel, which was subsequently sharply divided. At this time were able to visualize the L5 nerve root on the right side. We used the micro #4 Penfield instrument to mobilize the nerve root. We visualized the disc herniation directly underneath the nerve root and entered into it with the micro #4 Penfield. A blunt nerve hook was used to mobilize the disc fragments. We then resected the disc using a micro pituitary. Several large fragments were removed.  A nerve hook was used and verified sufficient decompression of the L5 nerve root on the right.     The surgical site was irrigated extensively and hemostasis was achieved using bipolar cautery. We then applied steroid onto the nerve root. The METRx tubes were slowly removed as bipolar cautery was used to provide hemostasis to the soft tissues and muscles. The fascia was closed with 0-0 Vicryl. The subdermal layers were closed in layers with Vicryl. The skin was closed with 4-0 monocryl in subcuticular fashion. The skin was dressed with Exofin. We assisted anesthesia with turning the patient over into the supine position. The patient was extubated and returned to the postoperative anesthesia care unit without incident.     At the end of the procedure, sponge, needle and instrument counts were correct. There were no intraoperative complications.    "Emilia was present and scrubbed for the entirety of the operation between opening and closing.      Operative note prepared by Charles \"Colton\" MD Windy, Neurosurgery, PGY-6        "

## 2022-12-13 NOTE — BRIEF OP NOTE
[facility]    Brief Operative Note    Pre-operative diagnosis: L5-S1 disc herniation  Post-operative diagnosis Same as pre-operative diagnosis    Procedure: Procedure(s):  Right Lumbar 5-Sacral1 Microdiscectomy  Surgeon: Surgeon(s) and Role:     * Syed Nieto MD - Primary     * Charles Temple MD - Resident - Assisting  Anesthesia: General   Estimated Blood Loss: 50cc     Drains: None  Specimens: * No specimens in log *  Findings:   decompressed L5 nerve root at L5-S1 disc space at end of case after resection of far lateral disc..  Complications: None.  Implants: * No implants in log *

## 2022-12-13 NOTE — ANESTHESIA POSTPROCEDURE EVALUATION
Patient: Carola Schaffer    Procedure: Procedure(s):  Right Lumbar 5-Sacral1 Microdiscectomy       Anesthesia Type:  General    Note:  Disposition: Inpatient   Postop Pain Control: Uneventful            Sign Out: Well controlled pain   PONV: No   Neuro/Psych: Uneventful            Sign Out: Acceptable/Baseline neuro status   Airway/Respiratory: Uneventful            Sign Out: Acceptable/Baseline resp. status   CV/Hemodynamics: Uneventful            Sign Out: Acceptable CV status; No obvious hypovolemia; No obvious fluid overload   Other NRE: NONE   DID A NON-ROUTINE EVENT OCCUR? No           Last vitals:  Vitals Value Taken Time   /67 12/13/22 1215   Temp 37  C (98.6  F) 12/13/22 1134   Pulse 76 12/13/22 1219   Resp 9 12/13/22 1219   SpO2 99 % 12/13/22 1219   Vitals shown include unvalidated device data.    Electronically Signed By: Mak Tinoco MD  December 13, 2022  12:20 PM

## 2022-12-20 ENCOUNTER — TELEPHONE (OUTPATIENT)
Dept: NEUROSURGERY | Facility: CLINIC | Age: 82
End: 2022-12-20

## 2022-12-20 NOTE — UTILIZATION REVIEW
Admission Status; Secondary Review Determination    No action to be taken. Please contact me on my Email : [email or web address] if you have any questions.    As part of the [place] Utilization review plan, a self-audit is done on Medicare inpatient admission with less than 2 midnights stay. The 2014 IPPS Final Rule allows outpatient billing in the event that a hospital determines that an inpatient admission was not medically necessary under utilization review process.     (x) Outpatient status would be Appropriate- Short Stay- Post discharge review.    RATIONALE FOR DETERMINATION    This patient underwent minimally invasive approach using Medtronic METRx tubes and  right L5-S1 Far-lateral microdicectomy.  CPT code 73042 is not on the CMS Inpatient Only List  There were no significant perioperative events warranting an inpatient level of stay        Patient was admitted and discharge after one night stay. Record was sent by  for a PA review. Based on the  severity of illness, intensity of service provided, expected LOS and risk for adverse outcome make the care appropriate for further outpatient/observation; however, doesn't meet criteria for hospital inpatient admission.       The information on this document is developed by the utilization review team in order for the business office to ensure compliance.  This only denotes the appropriateness of proper admission status and does not reflect the quality of care rendered.       The definitions of Inpatient Status and Observation Status used in making the determination above are those provided in the CMS Coverage Manual, Chapter 1 and Chapter 6, section 70.4.     Please cont me if you want to discuss further about this admission episode.      Lanre Lopez MD, FACP, LOUISE  Medical Director - Utilization management  Staff Hospitalist  [facility]    Pager: [phone]

## 2022-12-23 ENCOUNTER — OFFICE VISIT (OUTPATIENT)
Dept: NEUROSURGERY | Facility: CLINIC | Age: 82
End: 2022-12-23
Payer: MEDICARE

## 2022-12-23 VITALS
OXYGEN SATURATION: 97 % | DIASTOLIC BLOOD PRESSURE: 77 MMHG | BODY MASS INDEX: 28.32 KG/M2 | HEART RATE: 84 BPM | WEIGHT: 145 LBS | SYSTOLIC BLOOD PRESSURE: 126 MMHG

## 2022-12-23 DIAGNOSIS — M51.369 LUMBAR DEGENERATIVE DISC DISEASE: Primary | ICD-10-CM

## 2022-12-23 PROCEDURE — 99024 POSTOP FOLLOW-UP VISIT: CPT | Performed by: NEUROLOGICAL SURGERY

## 2022-12-23 ASSESSMENT — PAIN SCALES - GENERAL: PAINLEVEL: NO PAIN (0)

## 2022-12-23 NOTE — NURSING NOTE
Chief Complaint   Patient presents with     RECHECK     Follow up post- up     Yarelis Reilly CMA at 9:38 AM on 12/23/2022.

## 2022-12-23 NOTE — PROGRESS NOTES
Date of Service: 12/23/2022    Carola Schaffer is 82 year old female who is 2 weeks s/p right L5-S1 MIS Far-lateral microdiscectomy for right leg pain.      She reports that the pain immediately resolved after the surgery and has had no pain since.      Wound C/D/I    Impression/Plan:  She is doing very well and quite pleased with the surgical outcome.  Lifting restriction for another 4-6 weeks and then she can resume her full activities.  She will call me if any further problems arise in the future.     Thanks,    Syed Nieto MD

## 2022-12-23 NOTE — LETTER
12/23/2022       RE: Carola Schaffer  [address] [place] [address]     Dear Colleague,    Thank you for referring your patient, Carola Schaffer, to the [facility] [place] at [facility]. Please see a copy of my visit note below.    Date of Service: 12/23/2022    Carola Schaffer is 82 year old female who is 2 weeks s/p right L5-S1 MIS Far-lateral microdiscectomy for right leg pain.      She reports that the pain immediately resolved after the surgery and has had no pain since.      Wound C/D/I    Impression/Plan:  She is doing very well and quite pleased with the surgical outcome.  Lifting restriction for another 4-6 weeks and then she can resume her full activities.  She will call me if any further problems arise in the future.     Thanks,    Syed Nieto MD

## 2023-01-31 ENCOUNTER — DOCUMENTATION ONLY (OUTPATIENT)
Dept: OTHER | Facility: CLINIC | Age: 83
End: 2023-01-31
Payer: MEDICARE

## 2023-09-02 ENCOUNTER — HEALTH MAINTENANCE LETTER (OUTPATIENT)
Age: 83
End: 2023-09-02

## 2024-10-26 ENCOUNTER — HEALTH MAINTENANCE LETTER (OUTPATIENT)
Age: 84
End: 2024-10-26

## 2024-11-07 RX ORDER — PYRIDOXINE HCL (VITAMIN B6) 50 MG
1 TABLET ORAL DAILY
COMMUNITY

## 2024-11-07 RX ORDER — ANTACID TABLETS 500 MG/1
1 TABLET, CHEWABLE ORAL 2 TIMES DAILY
COMMUNITY

## 2024-11-07 RX ORDER — ROSUVASTATIN CALCIUM 10 MG/1
10 TABLET, COATED ORAL DAILY
COMMUNITY

## 2024-11-13 NOTE — PROGRESS NOTES
11/13/24 0911   Discharge Planning   Patient/Family Anticipates Transition to home with family   Living Arrangements   People in Home spouse   Type of Residence Private Residence   Support System   Support Systems Spouse/Significant Other;Friends/Neighbors;Family Members   Medical Clearance   Date of Physical 10/31/24   Clinic Name [facility]

## 2024-11-17 ENCOUNTER — ANESTHESIA EVENT (OUTPATIENT)
Dept: SURGERY | Facility: CLINIC | Age: 84
End: 2024-11-17
Payer: MEDICARE

## 2024-11-17 NOTE — TREATMENT PLAN
Orthopedic Surgery Pre-Op Plan: Carola Schaffer  pre-op review. This is NOT an H&P   Surgeon: Dr. Comfort    [facility]: [facility]  Name of Surgery: Left Total Hip Arthroplasty  Date of Surgery: 11/18/24  H&P: Completed on 10/31/24 by Dr. Elijah Woodson at [facility].   History of ASA, NSAIDS, vitamin and/or herbal supplements, GLP-1 Agonist or SGLT Inhibitor medication taken within 10 days?: Yes: on Multivitamins: Patient instructed to hold these for 7 days before surgery.  History of blood thinners?: No    Plan:   1) Discharge Plan: Home POD 1 with assist of Family and Friends (Spouse, Family Members, Friends/Neighbors).  Please see Discharge Planning section near bottom of this note for further details.     2) Hyperlipidemia: On rosuvastatin.    3) Hypertension: Well-controlled on lisinopril, triamterene-hydrochlorothiazide, and metoprolol.  Patient instructed to hold lisinopril and triamterene-hydrochlorothiazide on the morning of surgery but to continue taking metoprolol.    4) Type 2 Diabetes Mellitus: Good control.  Most recent hemoglobin A1c 6.5 on 10/15/2024.  Blood glucose 140 on 10/15/2024.  On metformin.  Not on insulin. I recommend blood glucose checks at least three times a day and at bedtime during hospital stay. Goal BG < 180 to decrease risk for infection and wound healing complications. Nursing to please notify Hospitalist if BG > 180.  Patient may also benefit from a short-term coverage with sliding scale insulin during hospital stay.  Will defer to Hospitalist for management of this.  Their assistance is greatly appreciated.    5) Osteopenia: On alendronate.     Patient appears medically optimized for upcoming surgery. I would recommend Hospitalist Consult to assist with medical management. Please call me below with any questions on this patient.       Review of Systems Notable for: Hyperlipidemia, hypertension, type 2 diabetes mellitus-good control,  osteopenia.    Past Medical History:   Past Medical History:   Diagnosis Date    Arthritis     DDD (degenerative disc disease), lumbar     Diabetes mellitus (H)     Dyslipidemia     Hypertension 08/22/2013    Osteopenia     Spondylolisthesis of lumbar region     L4-5     Past Surgical History:   Procedure Laterality Date    BLEPHAROPLASTY      DISCECTOMY LUMBAR MINIMALLY INVASIVE ONE LEVEL Right 12/13/2022    Procedure: Right Lumbar 5-Sacral1 Microdiscectomy;  Surgeon: Syed Nieto MD;  Location:  OR    [facility] LIGATE FALLOPIAN TUBE      Description: Tubal Ligation;  Recorded: 05/09/2013;       Current Medications:  Patient's Medications   New Prescriptions    No medications on file   Previous Medications    ALENDRONATE (FOSAMAX) 35 MG TABLET    Take 35 mg by mouth every 7 days Wednesdays    CALCIUM CARBONATE 500 MG CHEW    Take 1 tablet by mouth 2 times daily.    CARBOXYMETHYLCELLULOSE PF (REFRESH PLUS) 0.5 % OPHTHALMIC SOLUTION    1 drop 3 times daily as needed for dry eyes    CHOLECALCIFEROL 25 MCG (1000 UT) TABS    Take 1,000 Units by mouth three times a week    CYANOCOBALAMIN (B-12) 50 MCG TABS    Take 1 tablet by mouth daily.    LINAGLIPTIN (TRADJENTA) 5 MG TABS TABLET    Take 5 mg by mouth daily    LISINOPRIL (PRINIVIL,ZESTRIL) 20 MG TABLET    Take 20 mg by mouth daily (with breakfast)    METFORMIN (GLUCOPHAGE-XR) 500 MG 24 HR TABLET    Take 500 mg by mouth 2 times daily (with meals)    METOPROLOL SUCCINATE ER (TOPROL XL) 50 MG 24 HR TABLET    Take 50 mg by mouth every evening    MULTIPLE VITAMINS-MINERALS (PRESERVISION AREDS 2 PO)    Take 1 capsule by mouth daily.    ROSUVASTATIN (CRESTOR) 10 MG TABLET    Take 10 mg by mouth daily.    TRIAMTERENE-HYDROCHLOROTHIAZIDE (MAXZIDE-25) 37.5-25 MG PER TABLET    Take 0.5 tablets by mouth every morning   Modified Medications    No medications on file   Discontinued Medications    ACETAMINOPHEN (TYLENOL) 500 MG TABLET    Take 500-1,000 mg by mouth every 8 hours as needed  for mild pain    CALCIUM CARBONATE ANTACID (TUMS ULTRA 1000 PO)    Take 1 tablet by mouth 2 times daily.    CYANOCOBALAMIN (VITAMIN B-12) 1000 MCG TABLET    Take 1,000 mcg by mouth three times a week    MELOXICAM (MOBIC) 15 MG TABLET    Take 15 mg by mouth daily as needed    MULTIPLE VITAMINS-MINERALS (PRESERVISION AREDS 2) CAPS    Take 1 capsule by mouth 2 times daily    PILOCARPINE (SALAGEN) 5 MG TABLET    Take 5 mg by mouth 3 times daily as needed    POLYETHYLENE GLYCOL (MIRALAX) 17 GM/DOSE POWDER    Take 17 g (1 capful) by mouth daily    SIMVASTATIN (ZOCOR) 80 MG TABLET    Take 80 mg by mouth At Bedtime       ALLERGIES:  No Known Allergies    Social History  Social History     Tobacco Use    Smoking status: Never    Smokeless tobacco: Never   Substance Use Topics    Alcohol use: Yes     Comment: 1 drink a day    Drug use: Never       Any Abnormal Recent Diagnostics? Yes  No recent hemoglobin or CBC checked at preop exam: We will check stat CBC in preop on day of surgery.  Hemoglobin A1c 6.5 on 10/15/2024: Shows good control of type 2 diabetes on metformin.  Blood glucose 140 on 10/15/2024: We will monitor BG's closely during hospital stay.  Goal BG <180.      Discharge Planning:     Home POD 1 with assist of Family and Friends (Spouse, Family Members, Friends/Neighbors).     11/13/24 0911   Discharge Planning   Patient/Family Anticipates Transition to home with family   Living Arrangements   People in Home spouse   Type of Residence Private Residence   Support System   Support Systems Spouse/Significant Other;Friends/Neighbors;Family Members   Medical Clearance   Date of Physical 10/31/24   Clinic Name PAOLA Bustillos, CNP   Advanced Practice Nurse Navigator- Orthopedics  [facility]   Phone: [phone]

## 2024-11-18 ENCOUNTER — HOSPITAL ENCOUNTER (OUTPATIENT)
Facility: CLINIC | Age: 84
Discharge: HOME | End: 2024-11-19
Attending: ORTHOPAEDIC SURGERY | Admitting: ORTHOPAEDIC SURGERY
Payer: MEDICARE

## 2024-11-18 ENCOUNTER — ANESTHESIA (OUTPATIENT)
Dept: SURGERY | Facility: CLINIC | Age: 84
End: 2024-11-18
Payer: MEDICARE

## 2024-11-18 ENCOUNTER — APPOINTMENT (OUTPATIENT)
Dept: RADIOLOGY | Facility: CLINIC | Age: 84
End: 2024-11-18
Attending: PHYSICIAN ASSISTANT
Payer: MEDICARE

## 2024-11-18 ENCOUNTER — APPOINTMENT (OUTPATIENT)
Dept: RADIOLOGY | Facility: CLINIC | Age: 84
End: 2024-11-18
Attending: ORTHOPAEDIC SURGERY
Payer: MEDICARE

## 2024-11-18 ENCOUNTER — APPOINTMENT (OUTPATIENT)
Dept: PHYSICAL THERAPY | Facility: CLINIC | Age: 84
End: 2024-11-18
Attending: ORTHOPAEDIC SURGERY
Payer: MEDICARE

## 2024-11-18 DIAGNOSIS — Z96.642 STATUS POST TOTAL HIP REPLACEMENT, LEFT: Primary | ICD-10-CM

## 2024-11-18 PROBLEM — M16.12 ARTHRITIS OF LEFT HIP: Status: ACTIVE | Noted: 2024-11-18

## 2024-11-18 LAB
CREAT SERPL-MCNC: 0.67 MG/DL (ref 0.51–0.95)
EGFRCR SERPLBLD CKD-EPI 2021: 86 ML/MIN/1.73M2
ERYTHROCYTE [DISTWIDTH] IN BLOOD BY AUTOMATED COUNT: 11.9 % (ref 10–15)
GLUCOSE BLDC GLUCOMTR-MCNC: 147 MG/DL (ref 70–99)
GLUCOSE BLDC GLUCOMTR-MCNC: 194 MG/DL (ref 70–99)
GLUCOSE BLDC GLUCOMTR-MCNC: 211 MG/DL (ref 70–99)
GLUCOSE BLDC GLUCOMTR-MCNC: 261 MG/DL (ref 70–99)
GLUCOSE BLDC GLUCOMTR-MCNC: 264 MG/DL (ref 70–99)
HCT VFR BLD AUTO: 34.4 % (ref 35–47)
HGB BLD-MCNC: 12 G/DL (ref 11.7–15.7)
MCH RBC QN AUTO: 31.7 PG (ref 26.5–33)
MCHC RBC AUTO-ENTMCNC: 34.9 G/DL (ref 31.5–36.5)
MCV RBC AUTO: 91 FL (ref 78–100)
PLATELET # BLD AUTO: 191 10E3/UL (ref 150–450)
POTASSIUM SERPL-SCNC: 3.7 MMOL/L (ref 3.4–5.3)
RBC # BLD AUTO: 3.78 10E6/UL (ref 3.8–5.2)
WBC # BLD AUTO: 7.2 10E3/UL (ref 4–11)

## 2024-11-18 PROCEDURE — 250N000013 HC RX MED GY IP 250 OP 250 PS 637: Performed by: ORTHOPAEDIC SURGERY

## 2024-11-18 PROCEDURE — 97110 THERAPEUTIC EXERCISES: CPT | Mod: GP

## 2024-11-18 PROCEDURE — 250N000013 HC RX MED GY IP 250 OP 250 PS 637: Performed by: ANESTHESIOLOGY

## 2024-11-18 PROCEDURE — C1776 JOINT DEVICE (IMPLANTABLE): HCPCS | Performed by: ORTHOPAEDIC SURGERY

## 2024-11-18 PROCEDURE — 258N000003 HC RX IP 258 OP 636: Performed by: NURSE ANESTHETIST, CERTIFIED REGISTERED

## 2024-11-18 PROCEDURE — 999N000063 XR HIP PORT LEFT 1 VIEW

## 2024-11-18 PROCEDURE — 360N000077 HC SURGERY LEVEL 4, PER MIN: Performed by: ORTHOPAEDIC SURGERY

## 2024-11-18 PROCEDURE — C1713 ANCHOR/SCREW BN/BN,TIS/BN: HCPCS | Performed by: ORTHOPAEDIC SURGERY

## 2024-11-18 PROCEDURE — 82565 ASSAY OF CREATININE: CPT | Performed by: PHYSICIAN ASSISTANT

## 2024-11-18 PROCEDURE — 250N000011 HC RX IP 250 OP 636: Performed by: ORTHOPAEDIC SURGERY

## 2024-11-18 PROCEDURE — 250N000009 HC RX 250: Performed by: PHYSICIAN ASSISTANT

## 2024-11-18 PROCEDURE — 250N000011 HC RX IP 250 OP 636: Performed by: ANESTHESIOLOGY

## 2024-11-18 PROCEDURE — 258N000001 HC RX 258: Performed by: ORTHOPAEDIC SURGERY

## 2024-11-18 PROCEDURE — 250N000013 HC RX MED GY IP 250 OP 250 PS 637: Performed by: PHYSICIAN ASSISTANT

## 2024-11-18 PROCEDURE — 710N000010 HC RECOVERY PHASE 1, LEVEL 2, PER MIN: Performed by: ORTHOPAEDIC SURGERY

## 2024-11-18 PROCEDURE — 999N000141 HC STATISTIC PRE-PROCEDURE NURSING ASSESSMENT: Performed by: ORTHOPAEDIC SURGERY

## 2024-11-18 PROCEDURE — 250N000012 HC RX MED GY IP 250 OP 636 PS 637: Performed by: STUDENT IN AN ORGANIZED HEALTH CARE EDUCATION/TRAINING PROGRAM

## 2024-11-18 PROCEDURE — 250N000009 HC RX 250: Performed by: NURSE ANESTHETIST, CERTIFIED REGISTERED

## 2024-11-18 PROCEDURE — 258N000003 HC RX IP 258 OP 636: Performed by: ORTHOPAEDIC SURGERY

## 2024-11-18 PROCEDURE — 85014 HEMATOCRIT: CPT | Performed by: PHYSICIAN ASSISTANT

## 2024-11-18 PROCEDURE — P9045 ALBUMIN (HUMAN), 5%, 250 ML: HCPCS | Performed by: NURSE ANESTHETIST, CERTIFIED REGISTERED

## 2024-11-18 PROCEDURE — 250N000011 HC RX IP 250 OP 636: Performed by: PHYSICIAN ASSISTANT

## 2024-11-18 PROCEDURE — 97161 PT EVAL LOW COMPLEX 20 MIN: CPT | Mod: GP

## 2024-11-18 PROCEDURE — 272N000002 HC OR SUPPLY OTHER OPNP: Performed by: ORTHOPAEDIC SURGERY

## 2024-11-18 PROCEDURE — 272N000001 HC OR GENERAL SUPPLY STERILE: Performed by: ORTHOPAEDIC SURGERY

## 2024-11-18 PROCEDURE — 250N000011 HC RX IP 250 OP 636: Performed by: NURSE ANESTHETIST, CERTIFIED REGISTERED

## 2024-11-18 PROCEDURE — 99204 OFFICE O/P NEW MOD 45 MIN: CPT | Performed by: STUDENT IN AN ORGANIZED HEALTH CARE EDUCATION/TRAINING PROGRAM

## 2024-11-18 PROCEDURE — 250N000009 HC RX 250: Performed by: ORTHOPAEDIC SURGERY

## 2024-11-18 PROCEDURE — 999N000065 XR PELVIS PORT 1/2 VIEWS

## 2024-11-18 PROCEDURE — 82962 GLUCOSE BLOOD TEST: CPT

## 2024-11-18 PROCEDURE — 370N000017 HC ANESTHESIA TECHNICAL FEE, PER MIN: Performed by: ORTHOPAEDIC SURGERY

## 2024-11-18 PROCEDURE — 36415 COLL VENOUS BLD VENIPUNCTURE: CPT | Performed by: PHYSICIAN ASSISTANT

## 2024-11-18 PROCEDURE — 84132 ASSAY OF SERUM POTASSIUM: CPT | Performed by: PHYSICIAN ASSISTANT

## 2024-11-18 PROCEDURE — 250N000013 HC RX MED GY IP 250 OP 250 PS 637: Performed by: STUDENT IN AN ORGANIZED HEALTH CARE EDUCATION/TRAINING PROGRAM

## 2024-11-18 PROCEDURE — 97116 GAIT TRAINING THERAPY: CPT | Mod: GP

## 2024-11-18 DEVICE — TRIDENT X3 10 DEGREE POLYETHYLENE INSERT
Type: IMPLANTABLE DEVICE | Site: HIP | Status: FUNCTIONAL
Brand: TRIDENT X3 INSERT

## 2024-11-18 DEVICE — UNIVERSAL DISTAL CEMENT SPACER
Type: IMPLANTABLE DEVICE | Site: HIP | Status: FUNCTIONAL
Brand: OMNIFIT

## 2024-11-18 DEVICE — 18.5MM BUCK CEMENT PLUG WITH                                    DISPOSABLE INSERTER
Type: IMPLANTABLE DEVICE | Site: HIP | Status: FUNCTIONAL
Brand: BUCK

## 2024-11-18 DEVICE — BONE CEMENT SIMPLEX W/TOBRAMYCIN 6197-9-001: Type: IMPLANTABLE DEVICE | Site: HIP | Status: FUNCTIONAL

## 2024-11-18 DEVICE — TRIDENT II PSL CLUSTERHOLE HA ACETABULAR SHELL 48D
Type: IMPLANTABLE DEVICE | Site: HIP | Status: FUNCTIONAL
Brand: TRIDENT II

## 2024-11-18 DEVICE — 6.5MM LOW PROFILE HEX SCREW 20MM
Type: IMPLANTABLE DEVICE | Site: HIP | Status: FUNCTIONAL
Brand: TRIDENT II

## 2024-11-18 DEVICE — CERAMIC C-TAPER FEMORAL HEAD
Type: IMPLANTABLE DEVICE | Site: HIP | Status: FUNCTIONAL
Brand: BIOLOX

## 2024-11-18 DEVICE — 6.5MM LOW PROFILE HEX SCREW 25MM
Type: IMPLANTABLE DEVICE | Site: HIP | Status: FUNCTIONAL
Brand: TRIDENT II

## 2024-11-18 DEVICE — 132 DEGREE CEMENTED HIP STEM
Type: IMPLANTABLE DEVICE | Site: HIP | Status: FUNCTIONAL
Brand: OMNIFIT

## 2024-11-18 RX ORDER — OXYCODONE HYDROCHLORIDE 5 MG/1
10 TABLET ORAL EVERY 4 HOURS PRN
Status: DISCONTINUED | OUTPATIENT
Start: 2024-11-18 | End: 2024-11-19

## 2024-11-18 RX ORDER — HYDROMORPHONE HCL IN WATER/PF 6 MG/30 ML
0.2 PATIENT CONTROLLED ANALGESIA SYRINGE INTRAVENOUS EVERY 5 MIN PRN
Status: DISCONTINUED | OUTPATIENT
Start: 2024-11-18 | End: 2024-11-18 | Stop reason: HOSPADM

## 2024-11-18 RX ORDER — OXYCODONE HYDROCHLORIDE 5 MG/1
5 TABLET ORAL EVERY 4 HOURS PRN
Status: DISCONTINUED | OUTPATIENT
Start: 2024-11-18 | End: 2024-11-19

## 2024-11-18 RX ORDER — LIDOCAINE 40 MG/G
CREAM TOPICAL
Status: DISCONTINUED | OUTPATIENT
Start: 2024-11-18 | End: 2024-11-18 | Stop reason: HOSPADM

## 2024-11-18 RX ORDER — DIPHENHYDRAMINE HCL 12.5 MG/5ML
12.5 SOLUTION ORAL EVERY 6 HOURS PRN
Status: DISCONTINUED | OUTPATIENT
Start: 2024-11-18 | End: 2024-11-19 | Stop reason: HOSPADM

## 2024-11-18 RX ORDER — DEXTROSE MONOHYDRATE 25 G/50ML
25-50 INJECTION, SOLUTION INTRAVENOUS
Status: DISCONTINUED | OUTPATIENT
Start: 2024-11-18 | End: 2024-11-19 | Stop reason: HOSPADM

## 2024-11-18 RX ORDER — METHOCARBAMOL 500 MG/1
500 TABLET, FILM COATED ORAL EVERY 6 HOURS PRN
Status: DISCONTINUED | OUTPATIENT
Start: 2024-11-18 | End: 2024-11-19 | Stop reason: HOSPADM

## 2024-11-18 RX ORDER — ROSUVASTATIN CALCIUM 10 MG/1
10 TABLET, COATED ORAL DAILY
Status: DISCONTINUED | OUTPATIENT
Start: 2024-11-18 | End: 2024-11-19 | Stop reason: HOSPADM

## 2024-11-18 RX ORDER — ONDANSETRON 4 MG/1
4 TABLET, ORALLY DISINTEGRATING ORAL EVERY 30 MIN PRN
Status: DISCONTINUED | OUTPATIENT
Start: 2024-11-18 | End: 2024-11-18 | Stop reason: HOSPADM

## 2024-11-18 RX ORDER — SODIUM CHLORIDE, SODIUM LACTATE, POTASSIUM CHLORIDE, CALCIUM CHLORIDE 600; 310; 30; 20 MG/100ML; MG/100ML; MG/100ML; MG/100ML
INJECTION, SOLUTION INTRAVENOUS CONTINUOUS PRN
Status: DISCONTINUED | OUTPATIENT
Start: 2024-11-18 | End: 2024-11-18

## 2024-11-18 RX ORDER — AMOXICILLIN 250 MG
1 CAPSULE ORAL 2 TIMES DAILY
Status: DISCONTINUED | OUTPATIENT
Start: 2024-11-18 | End: 2024-11-19 | Stop reason: HOSPADM

## 2024-11-18 RX ORDER — ASPIRIN 325 MG
325 TABLET, DELAYED RELEASE (ENTERIC COATED) ORAL DAILY
Status: SHIPPED
Start: 2024-11-18

## 2024-11-18 RX ORDER — CEFAZOLIN SODIUM 1 G/3ML
1 INJECTION, POWDER, FOR SOLUTION INTRAMUSCULAR; INTRAVENOUS EVERY 8 HOURS
Status: COMPLETED | OUTPATIENT
Start: 2024-11-18 | End: 2024-11-19

## 2024-11-18 RX ORDER — ACETAMINOPHEN 325 MG/1
650 TABLET ORAL EVERY 4 HOURS PRN
Status: DISCONTINUED | OUTPATIENT
Start: 2024-11-21 | End: 2024-11-19 | Stop reason: HOSPADM

## 2024-11-18 RX ORDER — FENTANYL CITRATE 50 UG/ML
25 INJECTION, SOLUTION INTRAMUSCULAR; INTRAVENOUS EVERY 5 MIN PRN
Status: DISCONTINUED | OUTPATIENT
Start: 2024-11-18 | End: 2024-11-18 | Stop reason: HOSPADM

## 2024-11-18 RX ORDER — BISACODYL 10 MG
10 SUPPOSITORY, RECTAL RECTAL DAILY PRN
Status: DISCONTINUED | OUTPATIENT
Start: 2024-11-18 | End: 2024-11-19 | Stop reason: HOSPADM

## 2024-11-18 RX ORDER — SODIUM CHLORIDE, SODIUM LACTATE, POTASSIUM CHLORIDE, CALCIUM CHLORIDE 600; 310; 30; 20 MG/100ML; MG/100ML; MG/100ML; MG/100ML
INJECTION, SOLUTION INTRAVENOUS CONTINUOUS
Status: DISCONTINUED | OUTPATIENT
Start: 2024-11-18 | End: 2024-11-19

## 2024-11-18 RX ORDER — DEXAMETHASONE SODIUM PHOSPHATE 10 MG/ML
INJECTION, SOLUTION INTRAMUSCULAR; INTRAVENOUS PRN
Status: DISCONTINUED | OUTPATIENT
Start: 2024-11-18 | End: 2024-11-18

## 2024-11-18 RX ORDER — PROPOFOL 10 MG/ML
INJECTION, EMULSION INTRAVENOUS CONTINUOUS PRN
Status: DISCONTINUED | OUTPATIENT
Start: 2024-11-18 | End: 2024-11-18

## 2024-11-18 RX ORDER — LISINOPRIL 20 MG/1
20 TABLET ORAL
Status: DISCONTINUED | OUTPATIENT
Start: 2024-11-19 | End: 2024-11-19 | Stop reason: HOSPADM

## 2024-11-18 RX ORDER — SODIUM CHLORIDE, SODIUM LACTATE, POTASSIUM CHLORIDE, CALCIUM CHLORIDE 600; 310; 30; 20 MG/100ML; MG/100ML; MG/100ML; MG/100ML
INJECTION, SOLUTION INTRAVENOUS CONTINUOUS
Status: DISCONTINUED | OUTPATIENT
Start: 2024-11-18 | End: 2024-11-18 | Stop reason: HOSPADM

## 2024-11-18 RX ORDER — LIDOCAINE 40 MG/G
CREAM TOPICAL
Status: DISCONTINUED | OUTPATIENT
Start: 2024-11-18 | End: 2024-11-19 | Stop reason: HOSPADM

## 2024-11-18 RX ORDER — PROCHLORPERAZINE MALEATE 5 MG/1
5 TABLET ORAL EVERY 6 HOURS PRN
Status: DISCONTINUED | OUTPATIENT
Start: 2024-11-18 | End: 2024-11-19 | Stop reason: HOSPADM

## 2024-11-18 RX ORDER — ONDANSETRON 2 MG/ML
INJECTION INTRAMUSCULAR; INTRAVENOUS PRN
Status: DISCONTINUED | OUTPATIENT
Start: 2024-11-18 | End: 2024-11-18

## 2024-11-18 RX ORDER — AMOXICILLIN 250 MG
1-2 CAPSULE ORAL 2 TIMES DAILY
Status: SHIPPED
Start: 2024-11-18

## 2024-11-18 RX ORDER — HYDROMORPHONE HCL IN WATER/PF 6 MG/30 ML
0.2 PATIENT CONTROLLED ANALGESIA SYRINGE INTRAVENOUS
Status: DISCONTINUED | OUTPATIENT
Start: 2024-11-18 | End: 2024-11-19 | Stop reason: HOSPADM

## 2024-11-18 RX ORDER — FAMOTIDINE 20 MG/1
20 TABLET, FILM COATED ORAL 2 TIMES DAILY
Status: DISCONTINUED | OUTPATIENT
Start: 2024-11-18 | End: 2024-11-19 | Stop reason: HOSPADM

## 2024-11-18 RX ORDER — ACETAMINOPHEN 500 MG
500-1000 TABLET ORAL EVERY 6 HOURS PRN
COMMUNITY

## 2024-11-18 RX ORDER — CEFAZOLIN SODIUM/WATER 2 G/20 ML
2 SYRINGE (ML) INTRAVENOUS SEE ADMIN INSTRUCTIONS
Status: DISCONTINUED | OUTPATIENT
Start: 2024-11-18 | End: 2024-11-18 | Stop reason: HOSPADM

## 2024-11-18 RX ORDER — NALOXONE HYDROCHLORIDE 0.4 MG/ML
0.1 INJECTION, SOLUTION INTRAMUSCULAR; INTRAVENOUS; SUBCUTANEOUS
Status: DISCONTINUED | OUTPATIENT
Start: 2024-11-18 | End: 2024-11-18 | Stop reason: HOSPADM

## 2024-11-18 RX ORDER — ASPIRIN 325 MG
325 TABLET, DELAYED RELEASE (ENTERIC COATED) ORAL DAILY
Status: DISCONTINUED | OUTPATIENT
Start: 2024-11-18 | End: 2024-11-19 | Stop reason: HOSPADM

## 2024-11-18 RX ORDER — METHOCARBAMOL 500 MG/1
500 TABLET, FILM COATED ORAL 4 TIMES DAILY PRN
Qty: 60 TABLET | Refills: 1 | Status: SHIPPED | OUTPATIENT
Start: 2024-11-18

## 2024-11-18 RX ORDER — LIDOCAINE HYDROCHLORIDE 10 MG/ML
INJECTION, SOLUTION INFILTRATION; PERINEURAL PRN
Status: DISCONTINUED | OUTPATIENT
Start: 2024-11-18 | End: 2024-11-18

## 2024-11-18 RX ORDER — TRIAMTERENE AND HYDROCHLOROTHIAZIDE 37.5; 25 MG/1; MG/1
1 TABLET ORAL DAILY
Status: DISCONTINUED | OUTPATIENT
Start: 2024-11-19 | End: 2024-11-19 | Stop reason: HOSPADM

## 2024-11-18 RX ORDER — BUPIVACAINE HYDROCHLORIDE 5 MG/ML
INJECTION, SOLUTION EPIDURAL; INTRACAUDAL
Status: DISCONTINUED | OUTPATIENT
Start: 2024-11-18 | End: 2024-11-18

## 2024-11-18 RX ORDER — CARBOXYMETHYLCELLULOSE SODIUM 5 MG/ML
1-2 SOLUTION/ DROPS OPHTHALMIC EVERY 4 HOURS PRN
Status: DISCONTINUED | OUTPATIENT
Start: 2024-11-18 | End: 2024-11-19 | Stop reason: HOSPADM

## 2024-11-18 RX ORDER — POLYETHYLENE GLYCOL 3350 17 G/17G
17 POWDER, FOR SOLUTION ORAL DAILY
Status: DISCONTINUED | OUTPATIENT
Start: 2024-11-19 | End: 2024-11-19 | Stop reason: HOSPADM

## 2024-11-18 RX ORDER — TRAMADOL HYDROCHLORIDE 50 MG/1
50 TABLET ORAL EVERY 6 HOURS PRN
Qty: 30 TABLET | Refills: 0 | Status: SHIPPED | OUTPATIENT
Start: 2024-11-18

## 2024-11-18 RX ORDER — CEFAZOLIN SODIUM/WATER 2 G/20 ML
2 SYRINGE (ML) INTRAVENOUS
Status: COMPLETED | OUTPATIENT
Start: 2024-11-18 | End: 2024-11-18

## 2024-11-18 RX ORDER — NALOXONE HYDROCHLORIDE 0.4 MG/ML
0.2 INJECTION, SOLUTION INTRAMUSCULAR; INTRAVENOUS; SUBCUTANEOUS
Status: DISCONTINUED | OUTPATIENT
Start: 2024-11-18 | End: 2024-11-19 | Stop reason: HOSPADM

## 2024-11-18 RX ORDER — NALOXONE HYDROCHLORIDE 0.4 MG/ML
0.4 INJECTION, SOLUTION INTRAMUSCULAR; INTRAVENOUS; SUBCUTANEOUS
Status: DISCONTINUED | OUTPATIENT
Start: 2024-11-18 | End: 2024-11-19 | Stop reason: HOSPADM

## 2024-11-18 RX ORDER — ACETAMINOPHEN 325 MG/1
975 TABLET ORAL ONCE
Status: COMPLETED | OUTPATIENT
Start: 2024-11-18 | End: 2024-11-18

## 2024-11-18 RX ORDER — ACETAMINOPHEN 325 MG/1
975 TABLET ORAL ONCE
Status: DISCONTINUED | OUTPATIENT
Start: 2024-11-18 | End: 2024-11-18 | Stop reason: HOSPADM

## 2024-11-18 RX ORDER — ONDANSETRON 2 MG/ML
4 INJECTION INTRAMUSCULAR; INTRAVENOUS EVERY 6 HOURS PRN
Status: DISCONTINUED | OUTPATIENT
Start: 2024-11-18 | End: 2024-11-19 | Stop reason: HOSPADM

## 2024-11-18 RX ORDER — HYDROMORPHONE HCL IN WATER/PF 6 MG/30 ML
0.4 PATIENT CONTROLLED ANALGESIA SYRINGE INTRAVENOUS
Status: DISCONTINUED | OUTPATIENT
Start: 2024-11-18 | End: 2024-11-19 | Stop reason: HOSPADM

## 2024-11-18 RX ORDER — ACETAMINOPHEN 325 MG/1
975 TABLET ORAL EVERY 8 HOURS
Status: DISCONTINUED | OUTPATIENT
Start: 2024-11-18 | End: 2024-11-19 | Stop reason: HOSPADM

## 2024-11-18 RX ORDER — DEXAMETHASONE SODIUM PHOSPHATE 4 MG/ML
4 INJECTION, SOLUTION INTRA-ARTICULAR; INTRALESIONAL; INTRAMUSCULAR; INTRAVENOUS; SOFT TISSUE
Status: DISCONTINUED | OUTPATIENT
Start: 2024-11-18 | End: 2024-11-18 | Stop reason: HOSPADM

## 2024-11-18 RX ORDER — ONDANSETRON 4 MG/1
4 TABLET, ORALLY DISINTEGRATING ORAL EVERY 6 HOURS PRN
Status: DISCONTINUED | OUTPATIENT
Start: 2024-11-18 | End: 2024-11-19 | Stop reason: HOSPADM

## 2024-11-18 RX ORDER — TRANEXAMIC ACID 650 MG/1
1950 TABLET ORAL ONCE
Status: COMPLETED | OUTPATIENT
Start: 2024-11-18 | End: 2024-11-18

## 2024-11-18 RX ORDER — ONDANSETRON 2 MG/ML
4 INJECTION INTRAMUSCULAR; INTRAVENOUS EVERY 30 MIN PRN
Status: DISCONTINUED | OUTPATIENT
Start: 2024-11-18 | End: 2024-11-18 | Stop reason: HOSPADM

## 2024-11-18 RX ORDER — ACETAMINOPHEN 325 MG/1
650 TABLET ORAL EVERY 4 HOURS PRN
Status: SHIPPED
Start: 2024-11-18

## 2024-11-18 RX ORDER — NICOTINE POLACRILEX 4 MG
15-30 LOZENGE BUCCAL
Status: DISCONTINUED | OUTPATIENT
Start: 2024-11-18 | End: 2024-11-19 | Stop reason: HOSPADM

## 2024-11-18 RX ORDER — METOPROLOL SUCCINATE 50 MG/1
50 TABLET, EXTENDED RELEASE ORAL EVERY EVENING
Status: DISCONTINUED | OUTPATIENT
Start: 2024-11-19 | End: 2024-11-19 | Stop reason: HOSPADM

## 2024-11-18 RX ORDER — PROPOFOL 10 MG/ML
INJECTION, EMULSION INTRAVENOUS PRN
Status: DISCONTINUED | OUTPATIENT
Start: 2024-11-18 | End: 2024-11-18

## 2024-11-18 RX ADMIN — ACETAMINOPHEN 975 MG: 325 TABLET ORAL at 17:10

## 2024-11-18 RX ADMIN — SODIUM CHLORIDE, POTASSIUM CHLORIDE, SODIUM LACTATE AND CALCIUM CHLORIDE: 600; 310; 30; 20 INJECTION, SOLUTION INTRAVENOUS at 10:15

## 2024-11-18 RX ADMIN — SENNOSIDES AND DOCUSATE SODIUM 1 TABLET: 50; 8.6 TABLET ORAL at 21:25

## 2024-11-18 RX ADMIN — CALCIUM CARBONATE (ANTACID) CHEW TAB 500 MG 250 MG: 500 CHEW TAB at 21:25

## 2024-11-18 RX ADMIN — PROPOFOL 100 MCG/KG/MIN: 10 INJECTION, EMULSION INTRAVENOUS at 10:25

## 2024-11-18 RX ADMIN — LIDOCAINE HYDROCHLORIDE 50 MG: 10 INJECTION, SOLUTION INFILTRATION; PERINEURAL at 10:20

## 2024-11-18 RX ADMIN — DEXAMETHASONE SODIUM PHOSPHATE 4 MG: 10 INJECTION, SOLUTION INTRAMUSCULAR; INTRAVENOUS at 10:38

## 2024-11-18 RX ADMIN — ACETAMINOPHEN 975 MG: 325 TABLET ORAL at 08:17

## 2024-11-18 RX ADMIN — PHENYLEPHRINE HYDROCHLORIDE 100 MCG: 10 INJECTION INTRAVENOUS at 11:32

## 2024-11-18 RX ADMIN — INSULIN ASPART 2 UNITS: 100 INJECTION, SOLUTION INTRAVENOUS; SUBCUTANEOUS at 17:11

## 2024-11-18 RX ADMIN — BUPIVACAINE HYDROCHLORIDE 2.2 ML: 5 INJECTION, SOLUTION EPIDURAL; INTRACAUDAL; PERINEURAL at 10:26

## 2024-11-18 RX ADMIN — Medication 2 G: at 10:15

## 2024-11-18 RX ADMIN — ROSUVASTATIN CALCIUM 10 MG: 10 TABLET, FILM COATED ORAL at 14:48

## 2024-11-18 RX ADMIN — PROPOFOL 20 MG: 10 INJECTION, EMULSION INTRAVENOUS at 10:24

## 2024-11-18 RX ADMIN — FAMOTIDINE 20 MG: 20 TABLET, FILM COATED ORAL at 21:25

## 2024-11-18 RX ADMIN — ONDANSETRON 4 MG: 2 INJECTION INTRAMUSCULAR; INTRAVENOUS at 10:38

## 2024-11-18 RX ADMIN — ALBUMIN (HUMAN): 12.5 SOLUTION INTRAVENOUS at 11:40

## 2024-11-18 RX ADMIN — CEFAZOLIN 1 G: 1 INJECTION, POWDER, FOR SOLUTION INTRAMUSCULAR; INTRAVENOUS at 17:10

## 2024-11-18 RX ADMIN — ASPIRIN 325 MG: 325 TABLET ORAL at 14:48

## 2024-11-18 RX ADMIN — SODIUM CHLORIDE, POTASSIUM CHLORIDE, SODIUM LACTATE AND CALCIUM CHLORIDE: 600; 310; 30; 20 INJECTION, SOLUTION INTRAVENOUS at 14:48

## 2024-11-18 RX ADMIN — PHENYLEPHRINE HYDROCHLORIDE 0.2 MCG/KG/MIN: 10 INJECTION INTRAVENOUS at 10:35

## 2024-11-18 RX ADMIN — TRANEXAMIC ACID 1950 MG: 650 TABLET ORAL at 08:17

## 2024-11-18 ASSESSMENT — ACTIVITIES OF DAILY LIVING (ADL)
ADLS_ACUITY_SCORE: 0

## 2024-11-18 NOTE — INTERVAL H&P NOTE
"I have reviewed the surgical (or preoperative) H&P that is linked to this encounter, and examined the patient. There are no significant changes    Clinical Conditions Present on Arrival:  Clinically Significant Risk Factors Present on Admission                       # Overweight: Estimated body mass index is 26.23 kg/m  as calculated from the following:    Height as of this encounter: 1.511 m (4' 11.49\").    Weight as of this encounter: 59.9 kg (132 lb).       "

## 2024-11-18 NOTE — ANESTHESIA CARE TRANSFER NOTE
Patient: Carola Schaffer    Procedure: Procedure(s):  LEFT TOTAL HIP ARTHROPLASTY       Diagnosis: Left hip pain [M25.552]  Osteoarthritis of left hip [M16.12]  Diagnosis Additional Information: No value filed.    Anesthesia Type:   Spinal     Note:    Oropharynx: spontaneously breathing and oropharynx clear of all foreign objects  Level of Consciousness: awake  Oxygen Supplementation: face mask  Level of Supplemental Oxygen (L/min / FiO2): 6  Independent Airway: airway patency satisfactory and stable  Dentition: dentition unchanged  Vital Signs Stable: post-procedure vital signs reviewed and stable  Report to RN Given: handoff report given  Patient transferred to: PACU    Handoff Report: Identifed the Patient, Identified the Reponsible Provider, Reviewed the pertinent medical history, Discussed the surgical course, Reviewed Intra-OP anesthesia mangement and issues during anesthesia, Set expectations for post-procedure period and Allowed opportunity for questions and acknowledgement of understanding      Vitals:  Vitals Value Taken Time   BP     Temp 36.1  C (97  F) 11/18/24 1227   Pulse 89 11/18/24 1227   Resp 12 11/18/24 1227   SpO2 100 % 11/18/24 1227       Electronically Signed By: PAOLA Sarkar CRNA  November 18, 2024  12:29 PM

## 2024-11-18 NOTE — ANESTHESIA PREPROCEDURE EVALUATION
Anesthesia Pre-Procedure Evaluation    Patient: Carola Schaffer   MRN: [id number] : 1940        Procedure : Procedure(s):  LEFT TOTAL HIP ARTHROPLASTY          Past Medical History:   Diagnosis Date     Arthritis      DDD (degenerative disc disease), lumbar      Diabetes mellitus (H)      Dyslipidemia      Hypertension 2013     Osteopenia      Spondylolisthesis of lumbar region     L4-5      Past Surgical History:   Procedure Laterality Date     BLEPHAROPLASTY       DISCECTOMY LUMBAR MINIMALLY INVASIVE ONE LEVEL Right 2022    Procedure: Right Lumbar 5-Sacral1 Microdiscectomy;  Surgeon: Syed Nieto MD;  Location:  OR     [facility] LIGATE FALLOPIAN TUBE      Description: Tubal Ligation;  Recorded: 2013;      No Known Allergies   Social History     Tobacco Use     Smoking status: Never     Smokeless tobacco: Never   Substance Use Topics     Alcohol use: Yes     Comment: 1 drink a day      Wt Readings from Last 1 Encounters:   22 65.8 kg (145 lb)        Anesthesia Evaluation   Pt has had prior anesthetic.         ROS/MED HX  ENT/Pulmonary:  - neg pulmonary ROS     Neurologic:  - neg neurologic ROS     Cardiovascular: Comment: RBBB        (+) Dyslipidemia hypertension- -   -  - -      CHF (Diastolic dysfunction)                                METS/Exercise Tolerance: >4 METS    Hematologic:  - neg hematologic  ROS     Musculoskeletal:   (+)  arthritis,             GI/Hepatic:    (-) GERD   Renal/Genitourinary:  - neg Renal ROS     Endo:     (+)  type II DM,   Not using insulin,                 Psychiatric/Substance Use:       Infectious Disease:       Malignancy:       Other:            Physical Exam    Airway        Mallampati: II   TM distance: > 3 FB   Neck ROM: full   Mouth opening: > 3 cm    Respiratory Devices and Support         Dental         B=Bridge, C=Chipped, L=Loose, M=Missing    Cardiovascular          Rhythm and rate: regular and normal   (+) murmur       Pulmonary   pulmonary  "exam normal              OUTSIDE LABS:  CBC:   Lab Results   Component Value Date    WBC 5.8 12/02/2022    WBC 5.2 08/02/2018    HGB 13.0 12/02/2022    HGB 13.7 08/02/2018    HCT 38.7 12/02/2022    HCT 40.7 08/02/2018     12/02/2022     08/02/2018     BMP:   Lab Results   Component Value Date     12/02/2022     08/02/2018    POTASSIUM 4.3 12/02/2022    POTASSIUM 4.2 08/02/2018    CHLORIDE 104 12/02/2022    CHLORIDE 101 08/02/2018    CO2 25 12/02/2022    CO2 28 08/02/2018    BUN 23.0 12/02/2022    BUN 17 08/02/2018    CR 0.63 12/02/2022    CR 0.65 08/02/2018     (H) 12/13/2022     (H) 12/13/2022     COAGS: No results found for: \"PTT\", \"INR\", \"FIBR\"  POC: No results found for: \"BGM\", \"HCG\", \"HCGS\"  HEPATIC:   Lab Results   Component Value Date    ALBUMIN 4.0 08/02/2018    PROTTOTAL 6.4 08/02/2018    ALT 10 08/02/2018    AST 13 08/02/2018    ALKPHOS 61 08/02/2018    BILITOTAL 0.8 08/02/2018     OTHER:   Lab Results   Component Value Date    A1C 6.5 (H) 08/02/2018    KUN 9.6 12/02/2022    TSH 2.23 08/02/2018       Anesthesia Plan    ASA Status:  3    NPO Status:  NPO Appropriate    Anesthesia Type: Spinal.              Consents    Anesthesia Plan(s) and associated risks, benefits, and realistic alternatives discussed. Questions answered and patient/representative(s) expressed understanding.     - Discussed: Risks, Benefits and Alternatives for BOTH SEDATION and the PROCEDURE were discussed     - Discussed with:  Patient       - Patient is DNR/DNI Status: No     Use of blood products discussed: Yes.     - Discussed with: Patient.     - Consented: consented to blood products     Postoperative Care    Pain management: Multi-modal analgesia.   PONV prophylaxis: Ondansetron (or other 5HT-3), Dexamethasone or Solumedrol     Comments:    Other Comments: Keep MAP greater than 75 and SBP greater than 100.    Monitor BS per protocol           Hitesh Lang MD    I have reviewed the " "pertinent notes and labs in the chart from the past 30 days and (re)examined the patient.  Any updates or changes from those notes are reflected in this note.               # Hypertension: Noted on problem list           # Overweight: Estimated body mass index is 27.22 kg/m  as calculated from the following:    Height as of this encounter: 1.511 m (4' 11.49\").    Weight as of this encounter: 62.1 kg (137 lb).             "

## 2024-11-18 NOTE — ANESTHESIA PROCEDURE NOTES
"Intrathecal injection Procedure Note    Pre-Procedure   Staff -        Anesthesiologist:  Hitesh Lang MD       Performed By: anesthesiologist       Location: OR       Procedure Start/Stop Times: 11/18/2024 10:22 AM and 11/18/2024 10:26 AM       Pre-Anesthestic Checklist: patient identified, IV checked, risks and benefits discussed, informed consent, monitors and equipment checked, pre-op evaluation and at physician/surgeon's request  Timeout:       Correct Patient: Yes        Correct Procedure: Yes        Correct Site: Yes        Correct Position: Yes   Procedure Documentation  Procedure: intrathecal injection       Patient Position: sitting       Patient Prep/Sterile Barriers: sterile gloves, mask, patient draped       Skin prep: Chloraprep       Insertion Site: L2-3. (midline approach).       Needle Gauge: 24.        Needle Length (Inches): 4        Spinal Needle Type: Pencan       Introducer used       Introducer: 20 G       # of attempts: 1 and  # of redirects:  0    Assessment/Narrative         Paresthesias: No.       CSF fluid: clear.    Medication(s) Administered   0.5% Bupivacaine PF (Intrathecal) - Intrathecal   2.2 mL - 11/18/2024 10:26:00 AM  Medication Administration Time: 11/18/2024 10:22 AM      FOR [facility] ([facility]) ONLY:   Pain Team Contact information: please page the Pain Team Via [place]. Search \"Pain\". During daytime hours, please page the attending first. At night please page the resident first.      "

## 2024-11-18 NOTE — PHARMACY-ADMISSION MEDICATION HISTORY
Pharmacist Admission Medication History    Admission medication history is complete. The information provided in this note is only as accurate as the sources available at the time of the update.    Information Source(s): Patient, CareEverywhere/SureScripts, and medication list  via in-person    Pertinent Information: None     Allergies reviewed with patient and updates made in EHR: yes    Medication History Completed By: Franklin Ortiz [facility] 11/18/2024 8:35 AM    PTA Med List   Medication Sig Last Dose/Taking    acetaminophen (TYLENOL) 500 MG tablet Take 500-1,000 mg by mouth every 6 hours as needed for mild pain. Past Week    calcium carbonate 500 MG CHEW Take 1 tablet by mouth 2 times daily. 11/17/2024 Evening    carboxymethylcellulose PF (REFRESH PLUS) 0.5 % ophthalmic solution Place 1-2 drops into both eyes every 4 hours as needed for dry eyes. Past Week    cholecalciferol 25 MCG (1000 UT) TABS Take 1,000 Units by mouth daily. Past Week Morning    Cyanocobalamin (B-12) 50 MCG TABS Take 1 tablet by mouth daily. Past Week Morning    lisinopril (PRINIVIL,ZESTRIL) 20 MG tablet Take 20 mg by mouth daily (with breakfast) 11/17/2024 Morning    metFORMIN (GLUCOPHAGE-XR) 500 MG 24 hr tablet Take 500 mg by mouth 2 times daily (with meals) 11/17/2024 Evening    metoprolol succinate ER (TOPROL XL) 50 MG 24 hr tablet Take 50 mg by mouth every evening 11/17/2024 Evening    rosuvastatin (CRESTOR) 10 MG tablet Take 10 mg by mouth daily. 11/17/2024 Morning    triamterene-hydrochlorothiazide (MAXZIDE-25) 37.5-25 MG per tablet Take 0.5 tablets by mouth every morning 11/17/2024 Morning

## 2024-11-18 NOTE — BRIEF OP NOTE
[facility]    Brief Operative Note    Pre-operative diagnosis: Left hip pain [M25.552]  Osteoarthritis of left hip [M16.12]  Post-operative diagnosis Same as pre-operative diagnosis    Procedure: LEFT TOTAL HIP ARTHROPLASTY, Left - Hip    Surgeon: Surgeons and Role:     * Casey Johnson MD - Primary     * Ethan Almeida PA-C - Assisting  Anesthesia: Choice   Estimated Blood Loss: 300 ml    Drains: None  Specimens:   ID Type Source Tests Collected by Time Destination   A :  Bone Fragments Hip, Left OR DOCUMENTATION ONLY Casey Johnson MD 11/18/2024 11:24 AM      Findings:   None.  Complications: None.  Implants:   Implant Name Type Inv. Item Serial No.  Lot No. LRB No. Used Action   SHELL ACTB 48MM HIP CH HA TRDNT II PSL D STRL 742-11-48D - [id number] Total Joint Component/Insert SHELL ACTB 48MM HIP CH HA TRDNT II PSL D STRL 742-11-48D  [employer] [id number] Left 1 Implanted   BONE CEMENT SIMPLEX W/TOBRAMYCIN 6197-9-001 - [id number] Cement, Bone BONE CEMENT SIMPLEX W/TOBRAMYCIN 6197-9-001  [employer] [id number] Left 2 Implanted   PLUG CEMENT JUAREZ W/INSERT 18.5 [id number] - [id number] Total Joint Component/Insert PLUG CEMENT JUAREZ W/INSERT 18.5 [id number]  [employer] [id number] Left 1 Implanted   IMP SCR STRK LOW PROFILE HEX 6.5X25MM 7214-9887 - [id number] Metallic Hardware/Early IMP SCR STRK LOW PROFILE HEX 6.5X25MM 1525-6271  [employer] [place] Left 1 Implanted   IMP SCR STRK LOW PROFILE HEX 6.5X25MM 6674-5368 - [id number] Metallic Hardware/Early IMP SCR STRK LOW PROFILE HEX 6.5X25MM 7418-7452  [employer] [place] Left 1 Implanted   6.5MM LOW PROFILE HEX SCR 20MM - [id number] Metallic Hardware/Early 6.5MM LOW PROFILE HEX SCR 20MM  [employer] [id number] Left 1 Implanted   INSERT ACTB 10DEG 32MM 0D D HIP X3 TRDNT STRL LF - [id number] Total Joint Component/Insert INSERT ACTB 10DEG 32MM 0D D HIP X3 TRDNT STRL   [employer] [facility]  [id number] Left 1 Implanted   IMP SPACER OSTEONICS DISTAL CEMENT 10MM 3631-2805 - [id number] Metallic Hardware/[place] IMP SPACER OSTEONICS DISTAL CEMENT 10MM 7304-4149  [employer] [id number] Left 1 Implanted   IMP STEM FEMORAL MARYANNE EDY OMNIFIT SZ 5 132DEG 9868-0526 - [id number] Total Joint Component/Insert IMP STEM FEMORAL MARYANNE EDY OMNIFIT SZ 5 132DEG 5277-8482  [employer] [id number] Left 1 Implanted   IMP HEAD FEM STRK BIOLOX DELTA C-TPR 32MM +5 OFFST  - [id number] Total Joint Component/Insert IMP HEAD FEM STRK BIOLOX DELTA C-TPR 32MM +5 OFFST   [employer] [facility] [id number] Left 1 Implanted

## 2024-11-18 NOTE — OP NOTE
Total Hip Arthoplasty Operative Note        PLAN:  Weight bearing status: Weight bearing as tolerated   Activity: Activity as tolerated  Patient may move about with assist as indicated or with supervision   Anticoagulation plan:                 ASA 325mg po qd  for 42 days  Follow up plan                           Follow up in 2 week(s)        Name: Carola Schaffer    PCP: Elijah Woodson    Procedure Date: 11/18/2024    Pre-operative diagnosis: Left hip pain [M25.552]  Osteoarthritis of left hip [M16.12]   Post-operative diagnosis: Same   Procedure: Total hip arthoplasty (Left)   Surgeon: Casey Johnson MD     Assistant(s): Ethan Almeida PA-C   Anesthesia: Spinal Anesthesia   Estimated blood loss: 300 ml   Drains: None   Specimens: None       Findings: See full dictated operative note for details   Complications: None       Comments: See dictated operative report for full details     Indications:    The patient has experienced progressive left hip pain despite use of  Analgesics, NSAID's, Injection therapy and physical therapy. Because of the failure of these therapies to control symptoms and limited walking, night pain and altered activities the patient has decided to move ahead with joint replacement surgery.        Procedure and Findings:    After being informed of the risks, benefits, and alternatives to the procedure, the patient desired to proceed. Brought to the main operating suite where the patient was placed under spinal anesthetic. The patient was positioned in an Innomed hip jerez. The patient s Left lower extremity was prepped and draped in a manner appropriate for the procedure after a timeout verification step was complete.    Patient received 2 grams of intravenous Ancef. Ethan Almeida PA-C was present for the entire length of the case for the purposes of proper patient positioning, surgical exposure, and patient safety.    A minimally invasive posterior approach to the hip was taken.  IT band was divided. Gluteus fibers divided and the Charnley retractor was placed. Attention was directed towards the external rotators. The piriformis was identified and tagged. Minimus was elevated. The capsule was teed and tagged. Hip leg length and offset were then determined using a Smith and Nephew device with a pin in the innominate and the hip was then dislocated. The neck was resected using the Hipcricket guide. Full thickness cartilage loss was demonstrated involving the femoral head and acetabulum.     Attention was directed toward the acetabulum. Retractors were placed. Soft tissues were resected. Sequential reaming from 45 to 49 mm was carried out. Good cancellous bleeding bed was demonstrated. The trail 48 mm cup was stable. The final 48 mm Trident HA PSL cup was selected and secured with 3 supplemental fixation screws. A 10 degree liner was placed. Attention was directed towards the femur. Box chisel, canal finder, sequential broaching up to C5 was carried out. Trial reductions were carried out and excellent range of motion and stability and restoration of leg length and offset was demonstrated with a 5, 32 head. X-ray was obtained which demonstrated appropriate sizing and positioning of components. The trial components were then removed. The final 10 degree liner was secured. Inferior osteophytes were resected from the acetabulum. The implant Dinh C5, 132 degree offset stem was the secured. Trial reductions were carried out and a +5, 32 mm head was selected. The hip was reduced. The joint was copiously irrigated. The joint capsule and piriformis tendon was repaired back to the greater trochanter through drill holes in bone. Soft tissues were infiltrated with anesthetic solution. Care was taken to avoid neurovascular structures.   The IT band was closed with running #1 running Stratafix stitch. Subcutaneous closure With layered 2-0 Vicryl, skin was closed with 3-0 Stratafix and Aquacell dressing. Sterile  dressing was applied.  The patient returned to PAR in stable condition.       Casey Johnson MD    Date: 11/18/2024 Time: 12:23 PM      CONFIDENTIALITY NOTICE This message and any included attachments are from [facility] and are intended only for the addressee. The information contained in this message is confidential and may constitute inside or non-public information under international, federal, or state securities laws. Unauthorized forwarding, printing, copying, distribution, or use of such information is strictly prohibited and may be unlawful. If you are not the addressee, please promptly delete this message and notify the sender of the delivery error by e-mail.

## 2024-11-18 NOTE — PROGRESS NOTES
Patient arrived from PACU at 1420; orientated and family to their surroundings and call light within reach.

## 2024-11-18 NOTE — CONSULTS
"[facility]  Consult Note - Hospitalist Service  Date of Admission:  11/18/2024  Consult Requested by: Orthopedics  Reason for Consult: Hospitalist consult    Assessment & Plan   Carola Schaffer is a 84 year old female admitted on 11/18/2024. She has a past med history significant for type 2 diabetes, hypertension, and hyperlipidemia who presented to [facility] for routine left total hip arthroplasty Hospital medicine was consulted on for medical comanagement.    S/P left total knee arthroplasty  -Analgesics, antiemetics, DVT prophylaxis, and therapies per surgical team     Hypertension  -Lisinopril, metoprolol, and Maxide with holding parameter    Hyperlipidemia  -Continue Crestor    Type 2 diabetes  -Holding PTA oral medications while inpatient  -Medium dose sliding scale insulin       Clinically Significant Risk Factors Present on Admission                   # Hypertension: Noted on problem list           # Overweight: Estimated body mass index is 26.23 kg/m  as calculated from the following:    Height as of this encounter: 1.511 m (4' 11.49\").    Weight as of this encounter: 59.9 kg (132 lb).              Ankit Bills MD  Hospitalist Service  Securely message with [employer] (more info)  Text page via [facility] Paging/Directory   ______________________________________________________________________    Chief Complaint   Post-op    History is obtained from the patient    History of Present Illness   Carola Schaffer is a 84 year old female admitted on 11/18/2024. She has a past med history significant for type 2 diabetes, hypertension, and hyperlipidemia who presented to [facility] for routine left total hip arthroplasty Hospital medicine was consulted on for medical comanagement. Evaluated patient at bedside, introduced myself, explained the roll of hospital medicine, medically co-managing along side the primary team. Explained that the primary team is usually managing everything regarding the " surgery, including any pain medications, anti nausea, as well as therapies and anything else related to the procedure. Explained that we as hospitalists manage any medical conditions that already exist or may arise during your stay. Explained any and all questions to the best of my ability. Post operatively she is doing well, denies any other questions or concerns.        Past Medical History    Past Medical History:   Diagnosis Date    Arthritis     DDD (degenerative disc disease), lumbar     Diabetes mellitus (H)     Dyslipidemia     Hypertension 08/22/2013    Osteopenia     Spondylolisthesis of lumbar region     L4-5       Past Surgical History   Past Surgical History:   Procedure Laterality Date    BLEPHAROPLASTY      DISCECTOMY LUMBAR MINIMALLY INVASIVE ONE LEVEL Right 12/13/2022    Procedure: Right Lumbar 5-Sacral1 Microdiscectomy;  Surgeon: Syed Nieto MD;  Location:  OR    [facility] LIGATE FALLOPIAN TUBE      Description: Tubal Ligation;  Recorded: 05/09/2013;       Medications   I have reviewed this patient's current medications          Physical Exam   Vital Signs: Temp: 97  F (36.1  C) Temp src: Temporal BP: 133/60 Pulse: 93   Resp: 20 SpO2: 97 % O2 Device: None (Room air) Oxygen Delivery: 8 LPM  Weight: 132 lbs 0 oz    Gen: Appears well, NAD, on NC in PACU  Card:Warm well perfused, pulses assumed patient talking  Pulm: Normal I/E effort on RA  Abd:Non distended  Skin:No obvious rashes or lesions on exposed areas of skin  Neuro AxOx4, S/S grossly intact, no obvious FND,   Psych:Pleasant, answering questions appropriately, insight good, judgement good, does not appear to be responding to I/E stimuli     Medical Decision Making       45 MINUTES SPENT BY ME on the date of service doing chart review, history, exam, documentation & further activities per the note.      Data   ------------------------- PAST 24 HR DATA REVIEWED -----------------------------------------------    I have personally reviewed the  following data over the past 24 hrs:    7.2  \   12.0   / 191     N/A N/A N/A /  194 (H)   3.7 N/A 0.67 \       Imaging results reviewed over the past 24 hrs:   Recent Results (from the past 24 hours)   XR Pelvis Port 1/2 Views    Narrative    EXAM: XR PELVIS PORT 1/2 VIEWS  LOCATION: [facility]  DATE: 11/18/2024    INDICATION: Status post hip surgery.  COMPARISON: Same day intraoperative radiograph. 6/6/2024.      Impression    IMPRESSION: Signal AP view of the pelvis shows changes of an interval left total hip replacement. Nothing to suggest an acute displaced fracture or dislocation on this single view. Bones are demineralized. Atherosclerotic vascular calcifications.

## 2024-11-18 NOTE — ANESTHESIA POSTPROCEDURE EVALUATION
Patient: Carola Schaffer    Procedure: Procedure(s):  LEFT TOTAL HIP ARTHROPLASTY       Anesthesia Type:  Spinal    Note:  Disposition: Admission   Postop Pain Control: Uneventful            Sign Out: Well controlled pain   PONV: No   Neuro/Psych: Uneventful            Sign Out: Acceptable/Baseline neuro status   Airway/Respiratory: Uneventful            Sign Out: Acceptable/Baseline resp. status   CV/Hemodynamics: Uneventful            Sign Out: Acceptable CV status; No obvious hypovolemia; No obvious fluid overload   Other NRE: NONE   DID A NON-ROUTINE EVENT OCCUR? No           Last vitals:  Vitals Value Taken Time   /60 11/18/24 1343   Temp 36.1  C (97  F) 11/18/24 1400   Pulse 90 11/18/24 1357   Resp 20 11/18/24 1343   SpO2 97 % 11/18/24 1357   Vitals shown include unfiled device data.    Electronically Signed By: Hitesh Lang MD  November 18, 2024  3:38 PM

## 2024-11-18 NOTE — PROVIDER NOTIFICATION
Dr. Precious BUSH was notified over the phone about patient's blood sugar, blood pressure and temperature. She is shivering less and is asymptomatic.   ELEAZAR is okay with transferring patient out of phase 1.

## 2024-11-18 NOTE — PROGRESS NOTES
11/18/24 1600   Appointment Info   Signing Clinician's Name / Credentials (PT) Yung Kennedy PT   Quick Adds   Quick Adds Certification   Living Environment   People in Home spouse   Current Living Arrangements house   Home Accessibility stairs to enter home;stairs within home   Number of Stairs, Main Entrance 3   Stair Railings, Main Entrance railings safe and in good condition   Number of Stairs, Within Home, Primary greater than 10 stairs   Stair Railings, Within Home, Primary railing on left side (ascending)   Living Environment Comments can't sleep on main floor, has to go up stairs   Self-Care   Usual Activity Tolerance moderate   Current Activity Tolerance fair   Fall history within last six months no   General Information   Onset of Illness/Injury or Date of Surgery 11/18/24   Pertinent History of Current Problem (include personal factors and/or comorbidities that impact the POC) s/p L CYNDEE   Existing Precautions/Restrictions 90 degree hip flexion;no hip IR   Weight-Bearing Status - LLE weight-bearing as tolerated   Cognition   Affect/Mental Status (Cognition) WFL   Range of Motion (ROM)   ROM Comment decreased ROM s/p L CYNDEE   Strength (Manual Muscle Testing)   Strength (Manual Muscle Testing) No deficits observed during functional mobility   Transfers   Transfers sit-stand transfer   Sit-Stand Transfer   Sit-Stand [place] (Transfers) contact guard;verbal cues   Assistive Device (Sit-Stand Transfers) walker, front-wheeled   Gait/Stairs (Locomotion)   [place] Level (Gait) verbal cues;contact guard   Assistive Device (Gait) walker, front-wheeled   Distance in Feet (Gait) 10   Pattern (Gait) step-to   Deviations/Abnormal Patterns (Gait) antalgic;gait speed decreased   Balance   Balance no deficits were identified   Clinical Impression   Criteria for Skilled Therapeutic Intervention Yes, treatment indicated   PT Diagnosis (PT) impaired functional mobility   Influenced by the following impairments  pain, decreased ROM, impaired balance, decreased strength   Functional limitations due to impairments gait, stairs, transfers   Clinical Presentation (PT Evaluation Complexity) stable   Clinical Presentation Rationale pt presents as medically diagnosed   Clinical Decision Making (Complexity) low complexity   Planned Therapy Interventions (PT) gait training;home exercise program;patient/family education;stair training;transfer training   Risk & Benefits of therapy have been explained care plan/treatment goals reviewed;patient;spouse/significant other   PT Total Evaluation Time   PT Eval, Low Complexity Minutes (23406) 10   Therapy Certification   Start of care date 11/18/24   Certification date from 11/18/24   Certification date to 12/18/24   Physical Therapy Goals   PT Frequency Daily   PT Predicted Duration/Target Date for Goal Attainment 11/21/24   PT Goals PT Goal 1;Transfers;Gait;Stairs   PT: Transfers Modified independent;Sit to/from stand;Assistive device;Within precautions   PT: Gait Modified independent;Rolling walker;Within precautions;100 feet   PT: Stairs 8 stairs;Minimal assist;Rail on left;Assistive device;Within precautions   PT: Goal 1 Independent with written HEP for LE strengthening and ROM   Interventions   Interventions Quick Adds Therapeutic Procedure;Therapeutic Activity;Gait Training   Therapeutic Procedure/Exercise   Ther. Procedure: strength, endurance, ROM, flexibillity Minutes (95140) 15   Symptoms Noted During/After Treatment increased pain   Treatment Detail/Skilled Intervention CYNDEE protocol therex x10 reps, cueing for technique, Independent with written HEP following education   Therapeutic Activity   Therapeutic Activities: dynamic activities to improve functional performance Minutes (17501) 3   Treatment Detail/Skilled Intervention sit to/from stand, cueing for safe hand placement and LE positioning   Gait Training   Gait Training Minutes (05865) 8   Symptoms Noted During/After  Treatment (Gait Training) increased pain   Treatment Detail/Skilled Intervention vc for STS to push up from  bed,  vc for non recip pattern, c/o pain in L hip/leg with ambulations, slowly ambulated to doorwary and out to [place], Turned and walked back to chair slighlty faster smoother gait.  Paitent needing extra time to sit down and make comfortable in chair.  Patient comfortable after sitting for 2-3 minutes   Distance in Feet 40   [place] Level (Gait Training) contact guard   Physical Assistance Level (Gait Training) verbal cues;supervision;1 person assist   Weight Bearing (Gait Training) weight-bearing as tolerated   Assistive Device (Gait Training) rolling walker   Pattern Analysis (Gait Training) other (see comments)  (step-to)   Gait Analysis Deviations decreased step length;decreased stride length;decreased toe-to-floor clearance;decreased malik   Impairments (Gait Analysis/Training) pain   PT Discharge Planning   PT Plan Progress with gait/stairs, review HEP, states she can not sleep on main floor   PT Discharge Recommendation (DC Rec) other (see comments)   PT Rationale for DC Rec Patient has good support at home , states she must stay upstairs on 2nd floor bedroom   PT Brief overview of current status CGA for gait/transfers, 40', having a lot of pain with gait   PT Equipment Needed at Discharge cane, straight;walker, rolling   Physical Therapy Time and Intention   Timed Code Treatment Minutes 26   Total Session Time (sum of timed and untimed services) 36   M [facility]  OUTPATIENT PHYSICAL THERAPY EVALUATION  PLAN OF TREATMENT FOR OUTPATIENT REHABILITATION  (COMPLETE FOR INITIAL CLAIMS ONLY)  Patient's Last Name, First Name, M.I.  YOB: 1940  Carola Schaffer                        Provider's Name  [facility] Medical Record No.  [id number]                             Onset Date:  11/18/24   Start of Care Date:  11/18/24   Type:      _X_PT   ___OT   ___SLP Medical Diagnosis:                 PT Diagnosis:  impaired functional mobility Visits from SOC:  1     See note for plan of treatment, functional goals and certification details    I CERTIFY THE NEED FOR THESE SERVICES FURNISHED UNDER        THIS PLAN OF TREATMENT AND WHILE UNDER MY CARE     (Physician co-signature of this document indicates review and certification of the therapy plan).

## 2024-11-19 ENCOUNTER — APPOINTMENT (OUTPATIENT)
Dept: PHYSICAL THERAPY | Facility: CLINIC | Age: 84
End: 2024-11-19
Attending: ORTHOPAEDIC SURGERY
Payer: MEDICARE

## 2024-11-19 ENCOUNTER — APPOINTMENT (OUTPATIENT)
Dept: OCCUPATIONAL THERAPY | Facility: CLINIC | Age: 84
End: 2024-11-19
Attending: ORTHOPAEDIC SURGERY
Payer: MEDICARE

## 2024-11-19 VITALS
WEIGHT: 132 LBS | BODY MASS INDEX: 26.61 KG/M2 | RESPIRATION RATE: 18 BRPM | HEART RATE: 87 BPM | OXYGEN SATURATION: 97 % | SYSTOLIC BLOOD PRESSURE: 116 MMHG | HEIGHT: 59 IN | TEMPERATURE: 97.3 F | DIASTOLIC BLOOD PRESSURE: 70 MMHG

## 2024-11-19 LAB
GLUCOSE BLDC GLUCOMTR-MCNC: 132 MG/DL (ref 70–99)
GLUCOSE BLDC GLUCOMTR-MCNC: 167 MG/DL (ref 70–99)
HGB BLD-MCNC: 10 G/DL (ref 11.7–15.7)
HGB BLD-MCNC: 9.5 G/DL (ref 11.7–15.7)
HOLD SPECIMEN: NORMAL

## 2024-11-19 PROCEDURE — 97150 GROUP THERAPEUTIC PROCEDURES: CPT | Mod: GP

## 2024-11-19 PROCEDURE — 250N000013 HC RX MED GY IP 250 OP 250 PS 637: Performed by: STUDENT IN AN ORGANIZED HEALTH CARE EDUCATION/TRAINING PROGRAM

## 2024-11-19 PROCEDURE — 36415 COLL VENOUS BLD VENIPUNCTURE: CPT | Performed by: NURSE PRACTITIONER

## 2024-11-19 PROCEDURE — 250N000011 HC RX IP 250 OP 636: Performed by: ORTHOPAEDIC SURGERY

## 2024-11-19 PROCEDURE — 85018 HEMOGLOBIN: CPT | Performed by: NURSE PRACTITIONER

## 2024-11-19 PROCEDURE — 85018 HEMOGLOBIN: CPT | Performed by: ORTHOPAEDIC SURGERY

## 2024-11-19 PROCEDURE — 82962 GLUCOSE BLOOD TEST: CPT

## 2024-11-19 PROCEDURE — 97116 GAIT TRAINING THERAPY: CPT | Mod: GP,XU

## 2024-11-19 PROCEDURE — 250N000013 HC RX MED GY IP 250 OP 250 PS 637: Performed by: ORTHOPAEDIC SURGERY

## 2024-11-19 PROCEDURE — 97535 SELF CARE MNGMENT TRAINING: CPT | Mod: GO

## 2024-11-19 PROCEDURE — 97166 OT EVAL MOD COMPLEX 45 MIN: CPT | Mod: GO

## 2024-11-19 PROCEDURE — 36415 COLL VENOUS BLD VENIPUNCTURE: CPT | Performed by: ORTHOPAEDIC SURGERY

## 2024-11-19 RX ORDER — TRAMADOL HYDROCHLORIDE 50 MG/1
50 TABLET ORAL EVERY 6 HOURS PRN
Status: DISCONTINUED | OUTPATIENT
Start: 2024-11-19 | End: 2024-11-19 | Stop reason: HOSPADM

## 2024-11-19 RX ADMIN — INSULIN ASPART 1 UNITS: 100 INJECTION, SOLUTION INTRAVENOUS; SUBCUTANEOUS at 06:01

## 2024-11-19 RX ADMIN — CEFAZOLIN 1 G: 1 INJECTION, POWDER, FOR SOLUTION INTRAMUSCULAR; INTRAVENOUS at 01:08

## 2024-11-19 RX ADMIN — FAMOTIDINE 20 MG: 20 TABLET, FILM COATED ORAL at 08:00

## 2024-11-19 RX ADMIN — OXYCODONE HYDROCHLORIDE 5 MG: 5 TABLET ORAL at 05:54

## 2024-11-19 RX ADMIN — ASPIRIN 325 MG: 325 TABLET ORAL at 08:00

## 2024-11-19 RX ADMIN — POLYETHYLENE GLYCOL 3350 17 G: 17 POWDER, FOR SOLUTION ORAL at 08:05

## 2024-11-19 RX ADMIN — TRIAMTERENE AND HYDROCHLOROTHIAZIDE 1 HALF-TAB: 37.5; 25 TABLET ORAL at 08:00

## 2024-11-19 RX ADMIN — ACETAMINOPHEN 975 MG: 325 TABLET ORAL at 07:51

## 2024-11-19 RX ADMIN — ACETAMINOPHEN 975 MG: 325 TABLET ORAL at 01:08

## 2024-11-19 RX ADMIN — SENNOSIDES AND DOCUSATE SODIUM 1 TABLET: 50; 8.6 TABLET ORAL at 08:00

## 2024-11-19 RX ADMIN — ROSUVASTATIN CALCIUM 10 MG: 10 TABLET, FILM COATED ORAL at 07:52

## 2024-11-19 RX ADMIN — LISINOPRIL 20 MG: 20 TABLET ORAL at 07:52

## 2024-11-19 RX ADMIN — CALCIUM CARBONATE (ANTACID) CHEW TAB 500 MG 250 MG: 500 CHEW TAB at 08:00

## 2024-11-19 ASSESSMENT — ACTIVITIES OF DAILY LIVING (ADL)
ADLS_ACUITY_SCORE: 0

## 2024-11-19 NOTE — PROGRESS NOTES
Patient vital signs are at baseline: Yes  Patient able to ambulate as they were prior to admission or with assist devices provided by therapies during their stay:  Yes  Patient MUST void prior to discharge:  Yes  Patient able to tolerate oral intake:  Yes  Pain has adequate pain control using Oral analgesics:  Yes  Does patient have an identified :  Yes  Has goal D/C date and time been discussed with patient:  Yes     Patient is alert and oriented, vitally stable and on room air. pain has been well managed by PRN and scheduled meds. Worked with therapies, assist of 1 with transfers, gait belt and walker. Discharging home today with spouse.

## 2024-11-19 NOTE — PLAN OF CARE
DISCHARGE LOUNGE NOTE    Patient discharged to home at 2:17 PM via wheel chair. Accompanied by spouse and staff. Discharge instructions reviewed with patient and spouse, opportunity offered to ask questions. Prescriptions sent to patients preferred pharmacy. All belongings sent with patient. Orthopedic stoplight tool was reviewed and verbal understanding given. Spouse to transport.     Jhony Kenney RN

## 2024-11-19 NOTE — PROGRESS NOTES
11/19/24 0700   Appointment Info   Signing Clinician's Name / Credentials (OT) Olga Shukla, OTR/L   Rehab Comments (OT) OT rajan   Quick Adds   Quick Adds Certification   Living Environment   People in Home spouse   Current Living Arrangements house   Number of Stairs, Main Entrance 3   Living Environment Comments RTS w/BR upstairs, grab bars around toilet, tub/shower w/shower extended bench   Self-Care   Usual Activity Tolerance good   Current Activity Tolerance moderate   Equipment Currently Used at Home walker, rolling;tub bench  (has multi walkers to keep one upstairs, reacher)   Activity/Exercise/Self-Care Comment Pt independent w/ ADL and IADL at baseline.   General Information   Onset of Illness/Injury or Date of Surgery 11/18/24   Referring Physician Casey Johnson MD   Patient/Family Therapy Goal Statement (OT) wants to go home   Additional Occupational Profile Info/Pertinent History of Current Problem L CYNDEE   Existing Precautions/Restrictions weight bearing;no hip IR;90 degree hip flexion   Left Lower Extremity (Weight-bearing Status) weight-bearing as tolerated (WBAT)   Cognitive Status Examination   Orientation Status orientation to person, place and time   Affect/Mental Status (Cognitive) WNL   Visual Perception   Visual Impairment/Limitations WFL   Sensory   Sensory Quick Adds sensation intact   Posture   Posture not impaired   Range of Motion Comprehensive   General Range of Motion no range of motion deficits identified   Strength Comprehensive (MMT)   General Manual Muscle Testing (MMT) Assessment no strength deficits identified   Muscle Tone Assessment   Muscle Tone Quick Adds No deficits were identified   Coordination   Upper Extremity Coordination No deficits were identified   Bed Mobility   Bed Mobility supine-sit;sit-supine   Comment (Bed Mobility) SBA-CGA   Transfers   Transfers bed-chair transfer;sit-stand transfer;toilet transfer;shower transfer   Transfer Comments SBA-CGA   Balance    Balance Comments decreased   Clinical Impression   Criteria for Skilled Therapeutic Interventions Met (OT) Yes, treatment indicated   OT Diagnosis decreased ADLs   Influenced by the following impairments L CYNDEE   OT Problem List-Impairments impacting ADL activity tolerance impaired;pain;mobility   Assessment of Occupational Performance 3-5 Performance Deficits   Identified Performance Deficits dressing, bathing, functional mobility, toileting   Planned Therapy Interventions (OT) ADL retraining;bed mobility training;transfer training   Clinical Decision Making Complexity (OT) detailed assessment/moderate complexity   Risk & Benefits of therapy have been explained evaluation/treatment results reviewed;patient   OT Total Evaluation Time   OT Eval, Moderate Complexity Minutes (78590) 10   Therapy Certification   Medical Diagnosis L CYNDEE   Start of Care Date 11/19/24   Certification date from 11/19/24   Certification date to 12/19/24   OT Goals   Therapy Frequency (OT) 2 times/day   OT Predicted Duration/Target Date for Goal Attainment 11/19/24   OT Goals Lower Body Dressing;Bed Mobility;Toilet Transfer/Toileting   OT: Lower Body Dressing Minimal assist;using adaptive equipment;within precautions;Goal Met   OT: Bed Mobility Supervision/stand-by assist   OT: Toilet Transfer/Toileting Supervision/stand-by assist   Interventions   Interventions Quick Adds Self-Care/Home Management   Self-Care/Home Management   Self-Care/Home Mgmt/ADL, Compensatory, Meal Prep Minutes (23155) 30   Symptoms Noted During/After Treatment (Meal Preparation/Planning Training)   (Nausea/hot)   Treatment Detail/Skilled Intervention Pt edu on CYNDEE precautions and compensatory strategies. Pt edu on car transfers - pt verbalized understanding.Completed LE dressing - given Min A after instruction for doffing/donning pants & socks, and shoes. STS w/ SBA and vc for CYNDEE tech with increased effort due to pain. Amb. ~10 ft to BR w/ FWW, pain w/ mobility and  vc for safe tech. Upon arrival to BR door pt did not feel well and it was decided to return to chair.  Pt edu on safety technique for toilet/walkin shower transfer - completed each Mod I.  Returned to chair w/SBA and left pt with nursing staff.   OT Discharge Planning   OT Plan Additional OT session to complete bed/toilet transfer   OT Discharge Recommendation (DC Rec) other (see comments)   OT Rationale for DC Rec Primary barriers at this time is pain and nausea with activity and bedroom is upstairs. Pt has good spouse support at home to assist w/ADLsa as needed and good home set up.   OT Brief overview of current status CGA w/STS, increased pain w/activty   Total Session Time   Timed Code Treatment Minutes 30   Total Session Time (sum of timed and untimed services) 40    M [facility]  OUTPATIENT OCCUPATIONAL THERAPY  EVALUATION  PLAN OF TREATMENT FOR OUTPATIENT REHABILITATION  (COMPLETE FOR INITIAL CLAIMS ONLY)  Patient's Last Name, First Name, M.I.  YOB: 1940  Carola Schaffer                          Provider's Name  [facility] Medical Record No.  [id number]                             Onset Date:  11/18/24   Start of Care Date:  (P) 11/19/24   Type:     ___PT   _X_OT   ___SLP Medical Diagnosis:  (P) L CYNDEE                    OT Diagnosis:  decreased ADLs Visits from SOC:  1     See note for plan of treatment, functional goals and certification details    I CERTIFY THE NEED FOR THESE SERVICES FURNISHED UNDER        THIS PLAN OF TREATMENT AND WHILE UNDER MY CARE     (Physician co-signature of this document indicates review and certification of the therapy plan).

## 2024-11-19 NOTE — PROGRESS NOTES
"[facility] Orthopaedics Progress Note    Post-operative Day: 1 Day Post-Op    Procedure(s):  LEFT TOTAL HIP ARTHROPLASTY    Subjective: Patient seen up resting in bedside chair. No acute events overnight. Endorses pain to left hip, improved with PO analgesics. Nausea this AM when working with therapies. No vomiting. Voiding without difficulty and passing gas.     Pain: moderate  Chest pain, SOB:  No    Objective:  Blood pressure 116/70, pulse 87, temperature 97.3  F (36.3  C), temperature source Oral, resp. rate 18, height 1.511 m (4' 11.49\"), weight 59.9 kg (132 lb), SpO2 97%, not currently breastfeeding.    Patient Vitals for the past 24 hrs:   BP Temp Temp src Pulse Resp SpO2   11/19/24 0750 116/70 97.3  F (36.3  C) Oral 87 18 97 %   11/19/24 0034 134/63 97.9  F (36.6  C) Oral 95 18 97 %   11/18/24 2125 115/60 98.3  F (36.8  C) Oral 81 22 97 %   11/18/24 1726 (!) 167/72 97.8  F (36.6  C) Oral 83 12 98 %   11/18/24 1625 (!) 159/69 97.9  F (36.6  C) Oral 88 19 96 %   11/18/24 1529 120/58 97.2  F (36.2  C) Oral 84 21 95 %   11/18/24 1450 109/54 97.9  F (36.6  C) Oral 90 16 96 %   11/18/24 1425 127/59 97.5  F (36.4  C) Oral 89 18 97 %   11/18/24 1400 -- 97  F (36.1  C) -- -- -- --   11/18/24 1343 133/60 97  F (36.1  C) -- 93 20 97 %   11/18/24 1310 125/63 98.4  F (36.9  C) Temporal 92 26 97 %   11/18/24 1300 116/55 (!) 96.3  F (35.7  C) Core 90 30 97 %   11/18/24 1250 -- (!) 96.4  F (35.8  C) Core 91 22 96 %   11/18/24 1240 -- (!) 96.4  F (35.8  C) Core 88 25 99 %   11/18/24 1230 106/54 (!) 96.6  F (35.9  C) Core 86 13 100 %   11/18/24 1227 106/54 97  F (36.1  C) Core 89 12 100 %       Wt Readings from Last 4 Encounters:   11/18/24 59.9 kg (132 lb)   12/23/22 65.8 kg (145 lb)   12/13/22 66 kg (145 lb 8.1 oz)   12/02/22 67.1 kg (148 lb)       General: Alert and orientated, NAD  Respiratory: Non-labored breathing on RA  LLE motor function, sensation, and circulation intact   Yes, Dorsiflexion/plantarflexion intact and " equal bilaterally. Moves all other extremities with ease and purpose   Wound status: incisions are clean dry and intact. Yes  Calf tenderness: Bilateral  No    Pertinent Labs   Lab Results: personally reviewed.     Recent Labs   Lab Test 11/19/24  0707 11/18/24  0839 12/02/22  0947 08/02/18  0852   WBC  --  7.2 5.8 5.2   HGB 9.5* 12.0 13.0 13.7   HCT  --  34.4* 38.7 40.7   MCV  --  91 96 92   PLT  --  191 179 152   NA  --   --  142 138       Plan:   Hgb 9.5 this AM down from 12.0, currently asymptomatic and HD stable. Will recheck this AM to ensure stable.   Anticoagulation protocol:  mg daily  x 42  days  Pain medications:   tramadol and tylenol  Weight bearing status:  WBAT, posterior hip precautions.   Disposition:  Home pending    Continue cares and rehabilitation     Report completed by:  PAOLA Wahl CNP  Date: 11/19/2024  Time: 9:18 AM

## 2024-11-19 NOTE — PROGRESS NOTES
Pt A/O x 4. Hypertensive, otherwise VSS on RA. Complained of pain after working with PT, tylenol given; pt reported noted improvement. IVF @ 125. Ambulated x 3 today, voided, passing flatus. A1 w/ walker and gait belt. Family in room today.

## 2024-11-19 NOTE — DISCHARGE SUMMARY
[facility] Orth[facility]ics Discharge Summary                                  [facility]     DINORA BROWN [id number]   Age: 84 year old  PCP: Elijah Woodson, [phone] 1940     Date of Admission:  11/18/2024  Date of Discharge::  11/19/2024  Discharge Provider:  PAOLA Wahl CNP    Code status:  Full Code    Admission Information:  Admission Diagnosis:  Left hip pain [M25.552]  Osteoarthritis of left hip [M16.12]    Post-Operative Day: 1 Day Post-Op     Reason for admission:  The patient was admitted for the following:Procedure(s) (LRB):  LEFT TOTAL HIP ARTHROPLASTY (Left)    Active Problems:    Arthritis of left hip      Allergies:  Patient has no known allergies.    Following the procedure noted above the patient was transferred to the post-op floor and started on:    Therapy:  physical therapy and occupational therapy  Anticoagulation Plan:  mg daily  for 42 days  Pain Management: scopainmedication: tramadol and tylenol  Weight bearing status: Weight bearing as tolerated, posterior hip precautions      The patient was followed by Orthopedics during the inpatient treatment course:  Complications:  None  Additional consultations:  Hospitalist      Pertinent Labs   Lab Results: personally reviewed.     Recent Labs   Lab Test 11/19/24  1323 11/19/24  0707 11/18/24  0839 12/02/22  0947 08/02/18  0852   WBC  --   --  7.2 5.8 5.2   HGB 10.0* 9.5* 12.0 13.0 13.7   HCT  --   --  34.4* 38.7 40.7   MCV  --   --  91 96 92   PLT  --   --  191 179 152   NA  --   --   --  142 138          Discharge Information:  Condition at discharge: Stable  Discharge destination:  Discharged to home     Medications at discharge:  Current Discharge Medication List        START taking these medications    Details   !! acetaminophen (TYLENOL) 325 MG tablet Take 2 tablets (650 mg) by mouth every 4 hours as needed for other (mild pain).    Associated Diagnoses: Status post total hip replacement, left       aspirin (ASA) 325 MG EC tablet Take 1 tablet (325 mg) by mouth daily.    Associated Diagnoses: Status post total hip replacement, left      methocarbamol (ROBAXIN) 500 MG tablet Take 1 tablet (500 mg) by mouth 4 times daily as needed for muscle spasms or other (pain).  Qty: 60 tablet, Refills: 1    Associated Diagnoses: Status post total hip replacement, left      senna-docusate (SENOKOT-S/PERICOLACE) 8.6-50 MG tablet Take 1-2 tablets by mouth 2 times daily. Take while on oral narcotics to prevent or treat constipation.    Comments: While taking narcotics  Associated Diagnoses: Status post total hip replacement, left      traMADol (ULTRAM) 50 MG tablet Take 1 tablet (50 mg) by mouth every 6 hours as needed for moderate to severe pain.  Qty: 30 tablet, Refills: 0    Associated Diagnoses: Status post total hip replacement, left       !! - Potential duplicate medications found. Please discuss with provider.        CONTINUE these medications which have NOT CHANGED    Details   !! acetaminophen (TYLENOL) 500 MG tablet Take 500-1,000 mg by mouth every 6 hours as needed for mild pain.      calcium carbonate 500 MG CHEW Take 1 tablet by mouth 2 times daily.      carboxymethylcellulose PF (REFRESH PLUS) 0.5 % ophthalmic solution Place 1-2 drops into both eyes every 4 hours as needed for dry eyes.      cholecalciferol 25 MCG (1000 UT) TABS Take 1,000 Units by mouth daily.      Cyanocobalamin (B-12) 50 MCG TABS Take 1 tablet by mouth daily.      lisinopril (PRINIVIL,ZESTRIL) 20 MG tablet Take 20 mg by mouth daily (with breakfast)      metFORMIN (GLUCOPHAGE-XR) 500 MG 24 hr tablet Take 500 mg by mouth 2 times daily (with meals)      metoprolol succinate ER (TOPROL XL) 50 MG 24 hr tablet Take 50 mg by mouth every evening      rosuvastatin (CRESTOR) 10 MG tablet Take 10 mg by mouth daily.      triamterene-hydrochlorothiazide (MAXZIDE-25) 37.5-25 MG per tablet Take 0.5 tablets by mouth every morning      alendronate (FOSAMAX) 35  MG tablet Take 35 mg by mouth every 7 days Wednesdays      linagliptin (TRADJENTA) 5 MG TABS tablet Take 5 mg by mouth daily      Multiple Vitamins-Minerals (PRESERVISION AREDS 2 PO) Take 1 capsule by mouth daily.       !! - Potential duplicate medications found. Please discuss with provider.                     Follow-Up Care:  Patient should be seen in the office in 14 days by the Orthopedic Surgeon/Physician Assistant.  Call [phone] for appointment or questions.    It was my pleasure to take care of the above patient.  PAOLA Wahl CNP

## 2024-11-19 NOTE — PROGRESS NOTES
[facility] Daily Progress Note    Assessment/Plan:  Acute blood loss anemia  Iron rich diet  Asymptomatic.    S/P left total hip arthroplasty  -Analgesics, antiemetics, DVT prophylaxis, and therapies per surgical team      Hypertension  -Lisinopril, metoprolol, and Maxide with holding parameter     Hyperlipidemia  -Continue Crestor     Type 2 diabetes  Resume home meds     Active Problems:    Arthritis of left hip     LOS: 0 days     Subjective:  Doing well.  Denies any complaints.  No shortness of breath, chest pain, urinary or bowel complaints.  She had some nausea earlier that is resolved now.  Current Facility-Administered Medications   Medication Dose Route Frequency Provider Last Rate Last Admin    acetaminophen (TYLENOL) tablet 975 mg  975 mg Oral Q8H Casey Johnson MD   975 mg at 11/19/24 0751    aspirin (ASA) EC tablet 325 mg  325 mg Oral Daily Casey Johnson MD   325 mg at 11/19/24 0800    calcium carbonate (TUMS) chew half-tab 250 mg  250 mg Oral BID Ankit Bills MD   250 mg at 11/19/24 0800    famotidine (PEPCID) tablet 20 mg  20 mg Oral BID Casey Johnson MD   20 mg at 11/19/24 0800    Or    famotidine (PEPCID) injection 20 mg  20 mg Intravenous BID Casey Johnson MD        insulin aspart (NovoLOG) injection (RAPID ACTING)  1-7 Units Subcutaneous TID AC Ankit Bills MD   1 Units at 11/19/24 0601    insulin aspart (NovoLOG) injection (RAPID ACTING)  1-5 Units Subcutaneous At Bedtime Ankit Bills MD   2 Units at 11/18/24 2125    lisinopril (ZESTRIL) tablet 20 mg  20 mg Oral Daily with breakfast Ankit Bills MD   20 mg at 11/19/24 0752    metoprolol succinate ER (TOPROL XL) 24 hr tablet 50 mg  50 mg Oral QPM Ankit Bills MD        polyethylene glycol (MIRALAX) Packet 17 g  17 g Oral Daily Casey Johnson MD   17 g at 11/19/24 0805    rosuvastatin (CRESTOR) tablet 10 mg  10 mg Oral Daily Ankit Bills MD   10 mg at 11/19/24 0752    senna-docusate (SENOKOT-S/PERICOLACE) 8.6-50 MG per  tablet 1 tablet  1 tablet Oral BID Casey Johnson MD   1 tablet at 11/19/24 0800    sodium chloride (PF) 0.9% PF flush 3 mL  3 mL Intracatheter Q8H Casey Johnson MD        triamterene-hydrochlorothiazide half-tab 18.75-12.5 mg  1 half-tab Oral Daily Ankit Bills MD   1 half-tab at 11/19/24 0800       Objective:  Vital signs in last 24 hours:  Temp:  [97  F (36.1  C)-98.4  F (36.9  C)] 97.3  F (36.3  C)  Pulse:  [81-95] 87  Resp:  [12-26] 18  BP: (109-167)/(54-72) 116/70  SpO2:  [95 %-98 %] 97 %  Weight:   [unfilled]    Intake/Output last 3 shifts:  I/O last 3 completed shifts:  In: 2390 [P.O.:240; I.V.:1900]  Out: 1000 [Urine:700; Blood:300]  Intake/Output this shift:  No intake/output data recorded.    Review of Systems:   As per subjective, all others negative.    Physical Exam:    General Appearance:  Alert, cooperative, no distress, appears stated age   Head:  Normocephalic, without obvious abnormality, atraumatic   Lungs:   Clear to auscultation bilaterally, respirations unlabored   Chest Wall:  No tenderness or deformity   Heart:  Regular rate and rhythm, S1, S2 normal   Abdomen:   Soft, non tender, non distended, bowel sounds present, no guarding or rigidity   Extremities: Status post left CYNDEE   Skin: Skin color, texture, turgor normal, no rashes or lesions   Neurologic: Alert and oriented X 3, Moves all 4 extremities       Lab Results:  Personally Reviewed.   Recent Results (from the past 24 hours)   Glucose by meter    Collection Time: 11/18/24  1:10 PM   Result Value Ref Range    GLUCOSE BY METER POCT 194 (H) 70 - 99 mg/dL   Glucose by meter    Collection Time: 11/18/24  2:56 PM   Result Value Ref Range    GLUCOSE BY METER POCT 261 (H) 70 - 99 mg/dL   Glucose by meter    Collection Time: 11/18/24  5:10 PM   Result Value Ref Range    GLUCOSE BY METER POCT 211 (H) 70 - 99 mg/dL   Glucose by meter    Collection Time: 11/18/24  9:06 PM   Result Value Ref Range    GLUCOSE BY METER POCT 264 (H) 70 -  99 mg/dL   Glucose by meter    Collection Time: 11/19/24  5:53 AM   Result Value Ref Range    GLUCOSE BY METER POCT 167 (H) 70 - 99 mg/dL   Hemoglobin    Collection Time: 11/19/24  7:07 AM   Result Value Ref Range    Hemoglobin 9.5 (L) 11.7 - 15.7 g/dL   Extra Green Top (Lithium Heparin) Tube    Collection Time: 11/19/24  7:07 AM   Result Value Ref Range    Hold Specimen JIC    Glucose by meter    Collection Time: 11/19/24 11:42 AM   Result Value Ref Range    GLUCOSE BY METER POCT 132 (H) 70 - 99 mg/dL         Claudette Green M.D  [facility]  Internal Medicine

## 2024-11-19 NOTE — PLAN OF CARE
Physical Therapy Discharge Summary    Reason for therapy discharge:    All goals and outcomes met, no further needs identified.    Progress towards therapy goal(s). See goals on Care Plan in [facility] electronic health record for goal details.  Goals met    Therapy recommendation(s):    Continue home exercise program.

## (undated) DEVICE — SOL NACL 0.9% INJ 1000ML BAG 2B1324X

## (undated) DEVICE — SU VICRYL 3-0 SH 8X18" UND J864D

## (undated) DEVICE — BONE CEMENT MIXEVAC HI VAC W/CARTRIDGE 0306-563-000

## (undated) DEVICE — DRSG AQUACEL AG HYDROFIBER  3.5X10" 422605

## (undated) DEVICE — ESU CORD BIPOLAR GREEN 10-4000

## (undated) DEVICE — DRAPE IOBAN INCISE 23X17" 6650EZ

## (undated) DEVICE — GLOVE SURG PI ULTRA TOUCH M SZ 7 LF 42670

## (undated) DEVICE — SPONGE COTTONOID 1/2X1 1/2" 20-06S

## (undated) DEVICE — SYR BULB IRRIG DOVER 60 ML LATEX FREE 67000

## (undated) DEVICE — SPONGE LAP 18X18" X8435

## (undated) DEVICE — RX SURGIFLO HEMOSTATIC MATRIX W/THROMBIN 8ML 2994

## (undated) DEVICE — ESU HOLSTER PLASTIC DISP E2400

## (undated) DEVICE — DRSG MEPILEX BORDER FLEX 3X3" 595200

## (undated) DEVICE — RETRIVER SUTURE LASSO HOFFEE BLUE 710000

## (undated) DEVICE — DRAPE STERI TOWEL LG 1010

## (undated) DEVICE — ESU ELEC BLADE 6" COATED E1450-6

## (undated) DEVICE — CUSTOM PACK TOTAL HIP SOP5BTHHEA

## (undated) DEVICE — SOL WATER IRRIG 1000ML BOTTLE 2F7114

## (undated) DEVICE — PACK NEURO MINOR UMMC SNE32MNMU4

## (undated) DEVICE — SURGICEL HEMOSTAT 4X8" 1952

## (undated) DEVICE — DRAPE POUCH INSTRUMENT 1018

## (undated) DEVICE — SUCTION IRR SYSTEM W/O TIP INTERPULSE HANDPIECE 0210-100-000

## (undated) DEVICE — SUCTION MANIFOLD NEPTUNE 2 SYS 4 PORT 0702-020-000

## (undated) DEVICE — DRSG STERI STRIP 1/2X4" R1547

## (undated) DEVICE — ELECTRODE PATIENT RETURN ADULT L10 FT 2 PLATE CORD 0855C

## (undated) DEVICE — GLOVE UNDER INDICATOR PI SZ 8.5 LF 41685

## (undated) DEVICE — CEMENT PRESSURIZER FEMORAL CANAL MED 0206-546-000

## (undated) DEVICE — HOOD SURG T7PLUS PEEL AWAY FACE SHIELD STRL LF 0416-801-100

## (undated) DEVICE — SUCTION SLEEVE NEPTUNE 2 165MM 0703-005-165

## (undated) DEVICE — NDL SPINAL 18GA 3.5" 405184

## (undated) DEVICE — DRAPE MICROSCOPE LEICA 54X120" 09-MK653

## (undated) DEVICE — LINEN TOWEL PACK X30 5481

## (undated) DEVICE — ESU PENCIL W/COATED BLADE E2450H

## (undated) DEVICE — PREP POVIDONE IODINE SOLUTION 10% 4OZ BOTTLE 29906-004

## (undated) DEVICE — DRAPE MAYO STAND 23X54 8337

## (undated) DEVICE — DRAPE C-ARM W/STRAPS 42X72" 07-CA104

## (undated) DEVICE — DRAPE POUCH INSTRUMENT 3 POCKET 1018L

## (undated) DEVICE — ESU ELEC BLADE 6" COATED/INSULATED E1455-6

## (undated) DEVICE — TUBING SMOKE EVAC 3/8"X10' 0702-045-023

## (undated) DEVICE — SU MONOCRYL 4-0 PS-2 27" UND Y426H

## (undated) DEVICE — LINEN TOWEL PACK X6 WHITE 5487

## (undated) DEVICE — PREP POVIDONE-IODINE 7.5% SCRUB 4OZ BOTTLE MDS093945

## (undated) DEVICE — SU VICRYL 2-0 CT-2 CR 8X18" J726D

## (undated) DEVICE — DECANTER FLUID L3 IN TRANSFER STRL LF DYNJDEC03

## (undated) DEVICE — CATH IV ANGIO INTRO 14GA X 1 3/4" 381467

## (undated) DEVICE — SPONGE COTTONOID 1/2X1/2" 20-04S

## (undated) DEVICE — BONE CLEANING TIP INTERPULSE FEMORAL CANAL 0210-008-000

## (undated) DEVICE — POSITIONER ABDUCTION PILLOW FOAM MED FP-ABDUCTM

## (undated) DEVICE — SPONGE SURGIFOAM 100 1974

## (undated) DEVICE — HOLDER LIMB VELCRO OR 0814-1533

## (undated) DEVICE — SU STRATAFIX MONOCRYL 3-0 SPIRAL PS-2 30CM SXMP1B106

## (undated) DEVICE — SOL NACL 0.9% IRRIG 1000ML BOTTLE 2F7124

## (undated) DEVICE — GUIDE PIN SHORT PELVIS

## (undated) DEVICE — GOWN IMPERVIOUS BREATHABLE 2XL/XLONG

## (undated) DEVICE — BONE CLEANING TIP INTERPULSE  0210-010-000

## (undated) DEVICE — DRAPE SHEET MED 44X70" 9355

## (undated) DEVICE — SU STRATAFIX PDS PLUS 1 CT-1 18" SXPP1A404

## (undated) DEVICE — SPONGE COTTONOID 1X3" 20-10S

## (undated) DEVICE — DRAPE SHEET REV FOLD 3/4 9349

## (undated) DEVICE — BRUSH FEMORAL CANAL 110003

## (undated) DEVICE — BLADE SAGITTAL WIDE (SO-618) 2108-118

## (undated) DEVICE — SU ETHIBOND 1 CT-1 30" X425H

## (undated) DEVICE — NDL ANGIOCATH 14GA 1.25" 4048

## (undated) DEVICE — PREP CHLORAPREP CLEAR 3ML 930400

## (undated) DEVICE — SU FIBERWIRE 2 38" T-8 NDL  AR-7206

## (undated) DEVICE — PREP SKIN SCRUB TRAY 4461A

## (undated) DEVICE — GLOVE BIOGEL INDICATOR 7.5 LF 41675

## (undated) DEVICE — SU ETHILON 3-0 PS-1 18" 1663H

## (undated) DEVICE — SU VICRYL 0 CT-1 CR 8X18" J740D

## (undated) DEVICE — GLOVE SURG PI ULTRA TOUCH M SZ 8 LF

## (undated) DEVICE — SYR 30ML LL W/O NDL 302832

## (undated) DEVICE — COVER BIG CASE BACK TABLE 6'X2' 420-HD-S

## (undated) DEVICE — BUR STRK CARBIDE MATCH HEAD 3.0MM 5820-107-530C

## (undated) DEVICE — SUTURE VICRYL+ 2-0 27IN CT-1 UND VCP259H

## (undated) DEVICE — DRSG PRIMAPORE 03 1/8X6" 66000318

## (undated) DEVICE — ADH SKIN CLOSURE PREMIERPRO EXOFIN 1.0ML 3470

## (undated) DEVICE — Device

## (undated) RX ORDER — LABETALOL HYDROCHLORIDE 5 MG/ML
INJECTION, SOLUTION INTRAVENOUS
Status: DISPENSED
Start: 2022-12-13

## (undated) RX ORDER — BUPIVACAINE HYDROCHLORIDE AND EPINEPHRINE 2.5; 5 MG/ML; UG/ML
INJECTION, SOLUTION EPIDURAL; INFILTRATION; INTRACAUDAL; PERINEURAL
Status: DISPENSED
Start: 2022-12-13

## (undated) RX ORDER — PROPOFOL 10 MG/ML
INJECTION, EMULSION INTRAVENOUS
Status: DISPENSED
Start: 2024-11-18

## (undated) RX ORDER — GABAPENTIN 100 MG/1
CAPSULE ORAL
Status: DISPENSED
Start: 2022-12-13

## (undated) RX ORDER — FENTANYL CITRATE 50 UG/ML
INJECTION, SOLUTION INTRAMUSCULAR; INTRAVENOUS
Status: DISPENSED
Start: 2022-12-13

## (undated) RX ORDER — FENTANYL CITRATE-0.9 % NACL/PF 10 MCG/ML
PLASTIC BAG, INJECTION (ML) INTRAVENOUS
Status: DISPENSED
Start: 2022-12-13

## (undated) RX ORDER — DEXAMETHASONE SODIUM PHOSPHATE 4 MG/ML
INJECTION, SOLUTION INTRA-ARTICULAR; INTRALESIONAL; INTRAMUSCULAR; INTRAVENOUS; SOFT TISSUE
Status: DISPENSED
Start: 2024-11-18

## (undated) RX ORDER — PROPOFOL 10 MG/ML
INJECTION, EMULSION INTRAVENOUS
Status: DISPENSED
Start: 2022-12-13

## (undated) RX ORDER — BUPIVACAINE HYDROCHLORIDE 5 MG/ML
INJECTION, SOLUTION EPIDURAL; INTRACAUDAL
Status: DISPENSED
Start: 2024-11-18

## (undated) RX ORDER — LIDOCAINE HYDROCHLORIDE 10 MG/ML
INJECTION, SOLUTION EPIDURAL; INFILTRATION; INTRACAUDAL; PERINEURAL
Status: DISPENSED
Start: 2024-11-18

## (undated) RX ORDER — ONDANSETRON 2 MG/ML
INJECTION INTRAMUSCULAR; INTRAVENOUS
Status: DISPENSED
Start: 2024-11-18

## (undated) RX ORDER — SODIUM CHLORIDE, SODIUM LACTATE, POTASSIUM CHLORIDE, CALCIUM CHLORIDE 600; 310; 30; 20 MG/100ML; MG/100ML; MG/100ML; MG/100ML
INJECTION, SOLUTION INTRAVENOUS
Status: DISPENSED
Start: 2022-12-13

## (undated) RX ORDER — GLYCOPYRROLATE 0.2 MG/ML
INJECTION, SOLUTION INTRAMUSCULAR; INTRAVENOUS
Status: DISPENSED
Start: 2022-12-13

## (undated) RX ORDER — DEXAMETHASONE SODIUM PHOSPHATE 4 MG/ML
INJECTION, SOLUTION INTRA-ARTICULAR; INTRALESIONAL; INTRAMUSCULAR; INTRAVENOUS; SOFT TISSUE
Status: DISPENSED
Start: 2022-12-13

## (undated) RX ORDER — ONDANSETRON 2 MG/ML
INJECTION INTRAMUSCULAR; INTRAVENOUS
Status: DISPENSED
Start: 2022-12-13

## (undated) RX ORDER — CEFAZOLIN SODIUM/WATER 2 G/20 ML
SYRINGE (ML) INTRAVENOUS
Status: DISPENSED
Start: 2022-12-13